# Patient Record
Sex: FEMALE | Race: WHITE | URBAN - METROPOLITAN AREA
[De-identification: names, ages, dates, MRNs, and addresses within clinical notes are randomized per-mention and may not be internally consistent; named-entity substitution may affect disease eponyms.]

---

## 2017-07-31 ENCOUNTER — ANESTHESIA - HEALTHEAST (OUTPATIENT)
Dept: SURGERY | Facility: CLINIC | Age: 62
End: 2017-07-31

## 2017-08-02 ENCOUNTER — ANESTHESIA - HEALTHEAST (OUTPATIENT)
Dept: SURGERY | Facility: CLINIC | Age: 62
End: 2017-08-02

## 2017-08-04 ENCOUNTER — ANESTHESIA - HEALTHEAST (OUTPATIENT)
Dept: SURGERY | Facility: CLINIC | Age: 62
End: 2017-08-04

## 2017-08-07 ENCOUNTER — ANESTHESIA - HEALTHEAST (OUTPATIENT)
Dept: SURGERY | Facility: CLINIC | Age: 62
End: 2017-08-07

## 2017-08-11 ENCOUNTER — ANESTHESIA - HEALTHEAST (OUTPATIENT)
Dept: SURGERY | Facility: CLINIC | Age: 62
End: 2017-08-11

## 2017-08-14 ENCOUNTER — ANESTHESIA - HEALTHEAST (OUTPATIENT)
Dept: SURGERY | Facility: CLINIC | Age: 62
End: 2017-08-14

## 2017-08-16 ENCOUNTER — ANESTHESIA - HEALTHEAST (OUTPATIENT)
Dept: SURGERY | Facility: CLINIC | Age: 62
End: 2017-08-16

## 2017-08-21 ENCOUNTER — ANESTHESIA - HEALTHEAST (OUTPATIENT)
Dept: SURGERY | Facility: CLINIC | Age: 62
End: 2017-08-21

## 2017-08-25 ENCOUNTER — ANESTHESIA - HEALTHEAST (OUTPATIENT)
Dept: SURGERY | Facility: CLINIC | Age: 62
End: 2017-08-25

## 2017-08-28 ENCOUNTER — ANESTHESIA - HEALTHEAST (OUTPATIENT)
Dept: SURGERY | Facility: CLINIC | Age: 62
End: 2017-08-28

## 2017-08-30 ENCOUNTER — ANESTHESIA - HEALTHEAST (OUTPATIENT)
Dept: SURGERY | Facility: CLINIC | Age: 62
End: 2017-08-30

## 2017-09-01 ENCOUNTER — ANESTHESIA - HEALTHEAST (OUTPATIENT)
Dept: SURGERY | Facility: CLINIC | Age: 62
End: 2017-09-01

## 2017-09-07 ENCOUNTER — ANESTHESIA - HEALTHEAST (OUTPATIENT)
Dept: SURGERY | Facility: CLINIC | Age: 62
End: 2017-09-07

## 2017-09-15 ENCOUNTER — ANESTHESIA - HEALTHEAST (OUTPATIENT)
Dept: SURGERY | Facility: CLINIC | Age: 62
End: 2017-09-15

## 2017-09-22 ENCOUNTER — ANESTHESIA - HEALTHEAST (OUTPATIENT)
Dept: SURGERY | Facility: CLINIC | Age: 62
End: 2017-09-22

## 2017-09-29 ENCOUNTER — ANESTHESIA - HEALTHEAST (OUTPATIENT)
Dept: SURGERY | Facility: CLINIC | Age: 62
End: 2017-09-29

## 2017-10-06 ENCOUNTER — ANESTHESIA - HEALTHEAST (OUTPATIENT)
Dept: SURGERY | Facility: CLINIC | Age: 62
End: 2017-10-06

## 2017-10-13 ENCOUNTER — ANESTHESIA - HEALTHEAST (OUTPATIENT)
Dept: SURGERY | Facility: CLINIC | Age: 62
End: 2017-10-13

## 2017-10-20 ENCOUNTER — ANESTHESIA - HEALTHEAST (OUTPATIENT)
Dept: SURGERY | Facility: CLINIC | Age: 62
End: 2017-10-20

## 2017-10-30 ENCOUNTER — ANESTHESIA - HEALTHEAST (OUTPATIENT)
Dept: SURGERY | Facility: CLINIC | Age: 62
End: 2017-10-30

## 2017-11-10 ENCOUNTER — ANESTHESIA - HEALTHEAST (OUTPATIENT)
Dept: SURGERY | Facility: CLINIC | Age: 62
End: 2017-11-10

## 2017-11-17 ENCOUNTER — ANESTHESIA - HEALTHEAST (OUTPATIENT)
Dept: SURGERY | Facility: CLINIC | Age: 62
End: 2017-11-17

## 2017-11-24 ENCOUNTER — ANESTHESIA - HEALTHEAST (OUTPATIENT)
Dept: SURGERY | Facility: CLINIC | Age: 62
End: 2017-11-24

## 2017-12-01 ENCOUNTER — ANESTHESIA - HEALTHEAST (OUTPATIENT)
Dept: SURGERY | Facility: CLINIC | Age: 62
End: 2017-12-01

## 2017-12-08 ENCOUNTER — ANESTHESIA - HEALTHEAST (OUTPATIENT)
Dept: SURGERY | Facility: CLINIC | Age: 62
End: 2017-12-08

## 2017-12-15 ENCOUNTER — ANESTHESIA - HEALTHEAST (OUTPATIENT)
Dept: SURGERY | Facility: CLINIC | Age: 62
End: 2017-12-15

## 2018-02-16 ENCOUNTER — OFFICE VISIT - HEALTHEAST (OUTPATIENT)
Dept: FAMILY MEDICINE | Facility: CLINIC | Age: 63
End: 2018-02-16

## 2018-02-16 DIAGNOSIS — Z12.31 VISIT FOR SCREENING MAMMOGRAM: ICD-10-CM

## 2018-02-16 DIAGNOSIS — R63.4 WEIGHT LOSS: ICD-10-CM

## 2018-02-16 DIAGNOSIS — I10 ESSENTIAL HYPERTENSION, BENIGN: ICD-10-CM

## 2018-02-16 DIAGNOSIS — F25.0 SCHIZOAFFECTIVE DISORDER, BIPOLAR TYPE (H): ICD-10-CM

## 2018-02-16 ASSESSMENT — MIFFLIN-ST. JEOR: SCORE: 1703.48

## 2018-02-23 ENCOUNTER — AMBULATORY - HEALTHEAST (OUTPATIENT)
Dept: NURSING | Facility: CLINIC | Age: 63
End: 2018-02-23

## 2018-02-23 ENCOUNTER — COMMUNICATION - HEALTHEAST (OUTPATIENT)
Dept: FAMILY MEDICINE | Facility: CLINIC | Age: 63
End: 2018-02-23

## 2018-02-23 DIAGNOSIS — E78.00 PURE HYPERCHOLESTEROLEMIA: ICD-10-CM

## 2018-02-23 DIAGNOSIS — F25.0 SCHIZOAFFECTIVE DISORDER, BIPOLAR TYPE (H): ICD-10-CM

## 2018-02-27 ENCOUNTER — AMBULATORY - HEALTHEAST (OUTPATIENT)
Dept: NURSING | Facility: CLINIC | Age: 63
End: 2018-02-27

## 2018-03-15 ENCOUNTER — AMBULATORY - HEALTHEAST (OUTPATIENT)
Dept: LAB | Facility: CLINIC | Age: 63
End: 2018-03-15

## 2018-03-15 ENCOUNTER — AMBULATORY - HEALTHEAST (OUTPATIENT)
Dept: NURSING | Facility: CLINIC | Age: 63
End: 2018-03-15

## 2018-03-15 DIAGNOSIS — F25.0 SCHIZOAFFECTIVE DISORDER, BIPOLAR TYPE (H): ICD-10-CM

## 2018-03-15 DIAGNOSIS — E78.00 PURE HYPERCHOLESTEROLEMIA: ICD-10-CM

## 2018-03-15 DIAGNOSIS — Z01.30 BP CHECK: ICD-10-CM

## 2018-03-15 LAB
ALBUMIN SERPL-MCNC: 3.8 G/DL (ref 3.5–5)
ALP SERPL-CCNC: 72 U/L (ref 45–120)
ALT SERPL W P-5'-P-CCNC: 31 U/L (ref 0–45)
ANION GAP SERPL CALCULATED.3IONS-SCNC: 11 MMOL/L (ref 5–18)
AST SERPL W P-5'-P-CCNC: 23 U/L (ref 0–40)
BASOPHILS # BLD AUTO: 0 THOU/UL (ref 0–0.2)
BASOPHILS NFR BLD AUTO: 0 % (ref 0–2)
BILIRUB SERPL-MCNC: 0.3 MG/DL (ref 0–1)
BUN SERPL-MCNC: 13 MG/DL (ref 8–22)
CALCIUM SERPL-MCNC: 9.4 MG/DL (ref 8.5–10.5)
CHLORIDE BLD-SCNC: 108 MMOL/L (ref 98–107)
CHOLEST SERPL-MCNC: 281 MG/DL
CO2 SERPL-SCNC: 24 MMOL/L (ref 22–31)
CREAT SERPL-MCNC: 1.1 MG/DL (ref 0.6–1.1)
EOSINOPHIL # BLD AUTO: 0.2 THOU/UL (ref 0–0.4)
EOSINOPHIL NFR BLD AUTO: 3 % (ref 0–6)
ERYTHROCYTE [DISTWIDTH] IN BLOOD BY AUTOMATED COUNT: 13.9 % (ref 11–14.5)
FASTING STATUS PATIENT QL REPORTED: YES
GFR SERPL CREATININE-BSD FRML MDRD: 51 ML/MIN/1.73M2
GLUCOSE BLD-MCNC: 105 MG/DL (ref 70–125)
HCT VFR BLD AUTO: 44.3 % (ref 35–47)
HDLC SERPL-MCNC: 35 MG/DL
HGB BLD-MCNC: 13.7 G/DL (ref 12–16)
LDLC SERPL CALC-MCNC: 198 MG/DL
LYMPHOCYTES # BLD AUTO: 1.5 THOU/UL (ref 0.8–4.4)
LYMPHOCYTES NFR BLD AUTO: 25 % (ref 20–40)
MCH RBC QN AUTO: 27 PG (ref 27–34)
MCHC RBC AUTO-ENTMCNC: 30.9 G/DL (ref 32–36)
MCV RBC AUTO: 87 FL (ref 80–100)
MONOCYTES # BLD AUTO: 0.3 THOU/UL (ref 0–0.9)
MONOCYTES NFR BLD AUTO: 6 % (ref 2–10)
NEUTROPHILS # BLD AUTO: 4 THOU/UL (ref 2–7.7)
NEUTROPHILS NFR BLD AUTO: 66 % (ref 50–70)
PLATELET # BLD AUTO: 288 THOU/UL (ref 140–440)
PMV BLD AUTO: 11.4 FL (ref 8.5–12.5)
POTASSIUM BLD-SCNC: 4.4 MMOL/L (ref 3.5–5)
PROT SERPL-MCNC: 6.8 G/DL (ref 6–8)
RBC # BLD AUTO: 5.07 MILL/UL (ref 3.8–5.4)
SODIUM SERPL-SCNC: 143 MMOL/L (ref 136–145)
TRIGL SERPL-MCNC: 238 MG/DL
WBC: 6 THOU/UL (ref 4–11)

## 2018-03-16 ENCOUNTER — OFFICE VISIT - HEALTHEAST (OUTPATIENT)
Dept: FAMILY MEDICINE | Facility: CLINIC | Age: 63
End: 2018-03-16

## 2018-03-16 DIAGNOSIS — Z00.00 ROUTINE GENERAL MEDICAL EXAMINATION AT A HEALTH CARE FACILITY: ICD-10-CM

## 2018-03-16 DIAGNOSIS — Z29.9 PREVENTIVE MEASURE: ICD-10-CM

## 2018-03-16 DIAGNOSIS — E78.5 HYPERLIPIDEMIA: ICD-10-CM

## 2018-03-16 DIAGNOSIS — Z12.4 PAPANICOLAOU SMEAR FOR CERVICAL CANCER SCREENING: ICD-10-CM

## 2018-03-16 ASSESSMENT — MIFFLIN-ST. JEOR: SCORE: 1689.87

## 2018-03-19 LAB
HPV SOURCE: NORMAL
HUMAN PAPILLOMA VIRUS 16 DNA: NEGATIVE
HUMAN PAPILLOMA VIRUS 18 DNA: NEGATIVE
HUMAN PAPILLOMA VIRUS FINAL DIAGNOSIS: NORMAL
HUMAN PAPILLOMA VIRUS OTHER HR: NEGATIVE
SPECIMEN DESCRIPTION: NORMAL

## 2018-03-22 LAB

## 2018-03-23 ENCOUNTER — HOSPITAL ENCOUNTER (OUTPATIENT)
Dept: MAMMOGRAPHY | Facility: CLINIC | Age: 63
Discharge: HOME | End: 2018-03-23
Attending: FAMILY MEDICINE

## 2018-03-23 DIAGNOSIS — Z12.31 VISIT FOR SCREENING MAMMOGRAM: ICD-10-CM

## 2018-03-28 ENCOUNTER — COMMUNICATION - HEALTHEAST (OUTPATIENT)
Dept: FAMILY MEDICINE | Facility: CLINIC | Age: 63
End: 2018-03-28

## 2018-05-18 ENCOUNTER — AMBULATORY - HEALTHEAST (OUTPATIENT)
Dept: LAB | Facility: CLINIC | Age: 63
End: 2018-05-18

## 2018-05-18 ENCOUNTER — AMBULATORY - HEALTHEAST (OUTPATIENT)
Dept: FAMILY MEDICINE | Facility: CLINIC | Age: 63
End: 2018-05-18

## 2018-05-18 DIAGNOSIS — F25.0 SCHIZOAFFECTIVE DISORDER, BIPOLAR TYPE (H): ICD-10-CM

## 2018-05-18 DIAGNOSIS — E78.00 PURE HYPERCHOLESTEROLEMIA: ICD-10-CM

## 2018-05-18 DIAGNOSIS — Z79.899 HIGH RISK MEDICATION USE: ICD-10-CM

## 2018-05-18 LAB
ALBUMIN SERPL-MCNC: 3.7 G/DL (ref 3.5–5)
ALP SERPL-CCNC: 89 U/L (ref 45–120)
ALT SERPL W P-5'-P-CCNC: 18 U/L (ref 0–45)
ANION GAP SERPL CALCULATED.3IONS-SCNC: 11 MMOL/L (ref 5–18)
AST SERPL W P-5'-P-CCNC: 19 U/L (ref 0–40)
BASOPHILS # BLD AUTO: 0 THOU/UL (ref 0–0.2)
BASOPHILS NFR BLD AUTO: 0 % (ref 0–2)
BILIRUB DIRECT SERPL-MCNC: 0.1 MG/DL
BILIRUB SERPL-MCNC: 0.3 MG/DL (ref 0–1)
BUN SERPL-MCNC: 10 MG/DL (ref 8–22)
CALCIUM SERPL-MCNC: 9.5 MG/DL (ref 8.5–10.5)
CHLORIDE BLD-SCNC: 109 MMOL/L (ref 98–107)
CHOLEST SERPL-MCNC: 158 MG/DL
CO2 SERPL-SCNC: 25 MMOL/L (ref 22–31)
CREAT SERPL-MCNC: 0.89 MG/DL (ref 0.6–1.1)
EOSINOPHIL # BLD AUTO: 0.2 THOU/UL (ref 0–0.4)
EOSINOPHIL NFR BLD AUTO: 3 % (ref 0–6)
ERYTHROCYTE [DISTWIDTH] IN BLOOD BY AUTOMATED COUNT: 13.1 % (ref 11–14.5)
FASTING STATUS PATIENT QL REPORTED: YES
GFR SERPL CREATININE-BSD FRML MDRD: >60 ML/MIN/1.73M2
GLUCOSE BLD-MCNC: 103 MG/DL (ref 70–125)
HBA1C MFR BLD: 5.4 % (ref 3.5–6)
HCT VFR BLD AUTO: 42.2 % (ref 35–47)
HDLC SERPL-MCNC: 38 MG/DL
HGB BLD-MCNC: 13.8 G/DL (ref 12–16)
LDLC SERPL CALC-MCNC: 90 MG/DL
LYMPHOCYTES # BLD AUTO: 1.5 THOU/UL (ref 0.8–4.4)
LYMPHOCYTES NFR BLD AUTO: 25 % (ref 20–40)
MCH RBC QN AUTO: 26.4 PG (ref 27–34)
MCHC RBC AUTO-ENTMCNC: 32.8 G/DL (ref 32–36)
MCV RBC AUTO: 80 FL (ref 80–100)
MONOCYTES # BLD AUTO: 0.3 THOU/UL (ref 0–0.9)
MONOCYTES NFR BLD AUTO: 4 % (ref 2–10)
NEUTROPHILS # BLD AUTO: 4.2 THOU/UL (ref 2–7.7)
NEUTROPHILS NFR BLD AUTO: 68 % (ref 50–70)
PLATELET # BLD AUTO: 229 THOU/UL (ref 140–440)
PMV BLD AUTO: 8.3 FL (ref 7–10)
POTASSIUM BLD-SCNC: 4.2 MMOL/L (ref 3.5–5)
PROT SERPL-MCNC: 6.8 G/DL (ref 6–8)
RBC # BLD AUTO: 5.24 MILL/UL (ref 3.8–5.4)
SODIUM SERPL-SCNC: 145 MMOL/L (ref 136–145)
TRIGL SERPL-MCNC: 148 MG/DL
TSH SERPL DL<=0.005 MIU/L-ACNC: 1.18 UIU/ML (ref 0.3–5)
WBC: 6.1 THOU/UL (ref 4–11)

## 2018-05-21 ENCOUNTER — COMMUNICATION - HEALTHEAST (OUTPATIENT)
Dept: FAMILY MEDICINE | Facility: CLINIC | Age: 63
End: 2018-05-21

## 2018-05-24 ENCOUNTER — AMBULATORY - HEALTHEAST (OUTPATIENT)
Dept: NURSING | Facility: CLINIC | Age: 63
End: 2018-05-24

## 2018-06-07 ENCOUNTER — OFFICE VISIT - HEALTHEAST (OUTPATIENT)
Dept: FAMILY MEDICINE | Facility: CLINIC | Age: 63
End: 2018-06-07

## 2018-06-07 DIAGNOSIS — I10 ESSENTIAL HYPERTENSION, BENIGN: ICD-10-CM

## 2018-08-07 ENCOUNTER — AMBULATORY - HEALTHEAST (OUTPATIENT)
Dept: LAB | Facility: CLINIC | Age: 63
End: 2018-08-07

## 2018-08-07 DIAGNOSIS — I10 ESSENTIAL HYPERTENSION, BENIGN: ICD-10-CM

## 2018-08-07 LAB
ANION GAP SERPL CALCULATED.3IONS-SCNC: 13 MMOL/L (ref 5–18)
BUN SERPL-MCNC: 18 MG/DL (ref 8–22)
CALCIUM SERPL-MCNC: 9.7 MG/DL (ref 8.5–10.5)
CHLORIDE BLD-SCNC: 104 MMOL/L (ref 98–107)
CO2 SERPL-SCNC: 24 MMOL/L (ref 22–31)
CREAT SERPL-MCNC: 1.25 MG/DL (ref 0.6–1.1)
GFR SERPL CREATININE-BSD FRML MDRD: 44 ML/MIN/1.73M2
GLUCOSE BLD-MCNC: 82 MG/DL (ref 70–125)
POTASSIUM BLD-SCNC: 4.6 MMOL/L (ref 3.5–5)
SODIUM SERPL-SCNC: 141 MMOL/L (ref 136–145)

## 2018-08-08 ENCOUNTER — COMMUNICATION - HEALTHEAST (OUTPATIENT)
Dept: FAMILY MEDICINE | Facility: CLINIC | Age: 63
End: 2018-08-08

## 2018-08-09 ENCOUNTER — COMMUNICATION - HEALTHEAST (OUTPATIENT)
Dept: FAMILY MEDICINE | Facility: CLINIC | Age: 63
End: 2018-08-09

## 2018-08-13 ENCOUNTER — AMBULATORY - HEALTHEAST (OUTPATIENT)
Dept: FAMILY MEDICINE | Facility: CLINIC | Age: 63
End: 2018-08-13

## 2018-08-13 ENCOUNTER — AMBULATORY - HEALTHEAST (OUTPATIENT)
Dept: LAB | Facility: CLINIC | Age: 63
End: 2018-08-13

## 2018-08-13 DIAGNOSIS — N28.9 RENAL INSUFFICIENCY: ICD-10-CM

## 2018-08-13 LAB
ALBUMIN SERPL-MCNC: 3.8 G/DL (ref 3.5–5)
ALP SERPL-CCNC: 68 U/L (ref 45–120)
ALT SERPL W P-5'-P-CCNC: 25 U/L (ref 0–45)
ANION GAP SERPL CALCULATED.3IONS-SCNC: 11 MMOL/L (ref 5–18)
AST SERPL W P-5'-P-CCNC: 22 U/L (ref 0–40)
BILIRUB SERPL-MCNC: 0.7 MG/DL (ref 0–1)
BUN SERPL-MCNC: 11 MG/DL (ref 8–22)
CALCIUM SERPL-MCNC: 9.3 MG/DL (ref 8.5–10.5)
CHLORIDE BLD-SCNC: 91 MMOL/L (ref 98–107)
CO2 SERPL-SCNC: 23 MMOL/L (ref 22–31)
CREAT SERPL-MCNC: 0.91 MG/DL (ref 0.6–1.1)
GFR SERPL CREATININE-BSD FRML MDRD: >60 ML/MIN/1.73M2
GLUCOSE BLD-MCNC: 97 MG/DL (ref 70–125)
POTASSIUM BLD-SCNC: 4.4 MMOL/L (ref 3.5–5)
PROT SERPL-MCNC: 6.3 G/DL (ref 6–8)
SODIUM SERPL-SCNC: 125 MMOL/L (ref 136–145)

## 2018-08-14 ENCOUNTER — COMMUNICATION - HEALTHEAST (OUTPATIENT)
Dept: SCHEDULING | Facility: CLINIC | Age: 63
End: 2018-08-14

## 2018-08-14 ENCOUNTER — AMBULATORY - HEALTHEAST (OUTPATIENT)
Dept: FAMILY MEDICINE | Facility: CLINIC | Age: 63
End: 2018-08-14

## 2018-08-14 DIAGNOSIS — E87.1 HYPONATREMIA: ICD-10-CM

## 2018-08-15 ENCOUNTER — COMMUNICATION - HEALTHEAST (OUTPATIENT)
Dept: FAMILY MEDICINE | Facility: CLINIC | Age: 63
End: 2018-08-15

## 2018-08-16 ENCOUNTER — COMMUNICATION - HEALTHEAST (OUTPATIENT)
Dept: FAMILY MEDICINE | Facility: CLINIC | Age: 63
End: 2018-08-16

## 2018-09-11 ENCOUNTER — COMMUNICATION - HEALTHEAST (OUTPATIENT)
Dept: FAMILY MEDICINE | Facility: CLINIC | Age: 63
End: 2018-09-11

## 2018-09-11 ENCOUNTER — AMBULATORY - HEALTHEAST (OUTPATIENT)
Dept: LAB | Facility: CLINIC | Age: 63
End: 2018-09-11

## 2018-09-11 ENCOUNTER — AMBULATORY - HEALTHEAST (OUTPATIENT)
Dept: NURSING | Facility: CLINIC | Age: 63
End: 2018-09-11

## 2018-09-11 DIAGNOSIS — E87.1 HYPONATREMIA: ICD-10-CM

## 2018-09-11 LAB
ANION GAP SERPL CALCULATED.3IONS-SCNC: 11 MMOL/L (ref 5–18)
BUN SERPL-MCNC: 14 MG/DL (ref 8–22)
CALCIUM SERPL-MCNC: 9.3 MG/DL (ref 8.5–10.5)
CHLORIDE BLD-SCNC: 102 MMOL/L (ref 98–107)
CO2 SERPL-SCNC: 24 MMOL/L (ref 22–31)
CREAT SERPL-MCNC: 0.84 MG/DL (ref 0.6–1.1)
GFR SERPL CREATININE-BSD FRML MDRD: >60 ML/MIN/1.73M2
GLUCOSE BLD-MCNC: 84 MG/DL (ref 70–125)
POTASSIUM BLD-SCNC: 4.1 MMOL/L (ref 3.5–5)
SODIUM SERPL-SCNC: 137 MMOL/L (ref 136–145)

## 2018-09-14 ENCOUNTER — AMBULATORY - HEALTHEAST (OUTPATIENT)
Dept: FAMILY MEDICINE | Facility: CLINIC | Age: 63
End: 2018-09-14

## 2018-09-18 ENCOUNTER — COMMUNICATION - HEALTHEAST (OUTPATIENT)
Dept: FAMILY MEDICINE | Facility: CLINIC | Age: 63
End: 2018-09-18

## 2018-12-05 ENCOUNTER — COMMUNICATION - HEALTHEAST (OUTPATIENT)
Dept: SCHEDULING | Facility: CLINIC | Age: 63
End: 2018-12-05

## 2018-12-05 ENCOUNTER — COMMUNICATION - HEALTHEAST (OUTPATIENT)
Dept: FAMILY MEDICINE | Facility: CLINIC | Age: 63
End: 2018-12-05

## 2018-12-06 ENCOUNTER — COMMUNICATION - HEALTHEAST (OUTPATIENT)
Dept: FAMILY MEDICINE | Facility: CLINIC | Age: 63
End: 2018-12-06

## 2018-12-20 ENCOUNTER — COMMUNICATION - HEALTHEAST (OUTPATIENT)
Dept: SCHEDULING | Facility: CLINIC | Age: 63
End: 2018-12-20

## 2018-12-21 ENCOUNTER — COMMUNICATION - HEALTHEAST (OUTPATIENT)
Dept: SCHEDULING | Facility: CLINIC | Age: 63
End: 2018-12-21

## 2019-01-07 ENCOUNTER — COMMUNICATION - HEALTHEAST (OUTPATIENT)
Dept: SCHEDULING | Facility: CLINIC | Age: 64
End: 2019-01-07

## 2019-03-04 ENCOUNTER — COMMUNICATION - HEALTHEAST (OUTPATIENT)
Dept: FAMILY MEDICINE | Facility: CLINIC | Age: 64
End: 2019-03-04

## 2019-03-04 DIAGNOSIS — E78.5 HYPERLIPIDEMIA: ICD-10-CM

## 2019-03-04 DIAGNOSIS — I10 ESSENTIAL HYPERTENSION, BENIGN: ICD-10-CM

## 2019-04-26 ENCOUNTER — COMMUNICATION - HEALTHEAST (OUTPATIENT)
Dept: FAMILY MEDICINE | Facility: CLINIC | Age: 64
End: 2019-04-26

## 2019-05-14 ENCOUNTER — OFFICE VISIT - HEALTHEAST (OUTPATIENT)
Dept: FAMILY MEDICINE | Facility: CLINIC | Age: 64
End: 2019-05-14

## 2019-05-14 DIAGNOSIS — I10 ESSENTIAL HYPERTENSION, BENIGN: ICD-10-CM

## 2019-05-14 DIAGNOSIS — Z00.00 ROUTINE GENERAL MEDICAL EXAMINATION AT A HEALTH CARE FACILITY: ICD-10-CM

## 2019-05-14 DIAGNOSIS — L30.4 INTERTRIGO: ICD-10-CM

## 2019-05-14 DIAGNOSIS — F25.0 SCHIZOAFFECTIVE DISORDER, BIPOLAR TYPE (H): ICD-10-CM

## 2019-05-14 DIAGNOSIS — G47.33 OBSTRUCTIVE SLEEP APNEA (ADULT) (PEDIATRIC): ICD-10-CM

## 2019-05-14 DIAGNOSIS — E78.00 PURE HYPERCHOLESTEROLEMIA: ICD-10-CM

## 2019-05-14 DIAGNOSIS — R68.2 DRY MOUTH: ICD-10-CM

## 2019-05-14 LAB
ANION GAP SERPL CALCULATED.3IONS-SCNC: 13 MMOL/L (ref 5–18)
BUN SERPL-MCNC: 16 MG/DL (ref 8–22)
CALCIUM SERPL-MCNC: 10.4 MG/DL (ref 8.5–10.5)
CHLORIDE BLD-SCNC: 106 MMOL/L (ref 98–107)
CHOLEST SERPL-MCNC: 187 MG/DL
CO2 SERPL-SCNC: 21 MMOL/L (ref 22–31)
CREAT SERPL-MCNC: 1 MG/DL (ref 0.6–1.1)
FASTING STATUS PATIENT QL REPORTED: YES
GFR SERPL CREATININE-BSD FRML MDRD: 56 ML/MIN/1.73M2
GLUCOSE BLD-MCNC: 80 MG/DL (ref 70–125)
HDLC SERPL-MCNC: 49 MG/DL
LDLC SERPL CALC-MCNC: 110 MG/DL
POTASSIUM BLD-SCNC: 4 MMOL/L (ref 3.5–5)
SODIUM SERPL-SCNC: 140 MMOL/L (ref 136–145)
TRIGL SERPL-MCNC: 138 MG/DL

## 2019-05-14 ASSESSMENT — MIFFLIN-ST. JEOR: SCORE: 1441.52

## 2019-05-17 ENCOUNTER — COMMUNICATION - HEALTHEAST (OUTPATIENT)
Dept: FAMILY MEDICINE | Facility: CLINIC | Age: 64
End: 2019-05-17

## 2019-06-03 ENCOUNTER — COMMUNICATION - HEALTHEAST (OUTPATIENT)
Dept: FAMILY MEDICINE | Facility: CLINIC | Age: 64
End: 2019-06-03

## 2019-06-03 DIAGNOSIS — I10 ESSENTIAL HYPERTENSION, BENIGN: ICD-10-CM

## 2019-06-03 DIAGNOSIS — E78.5 HYPERLIPIDEMIA: ICD-10-CM

## 2019-10-20 ENCOUNTER — COMMUNICATION - HEALTHEAST (OUTPATIENT)
Dept: SCHEDULING | Facility: CLINIC | Age: 64
End: 2019-10-20

## 2019-10-23 ENCOUNTER — COMMUNICATION - HEALTHEAST (OUTPATIENT)
Dept: CARE COORDINATION | Facility: CLINIC | Age: 64
End: 2019-10-23

## 2019-10-24 ENCOUNTER — COMMUNICATION - HEALTHEAST (OUTPATIENT)
Dept: SCHEDULING | Facility: CLINIC | Age: 64
End: 2019-10-24

## 2019-10-25 ENCOUNTER — OFFICE VISIT - HEALTHEAST (OUTPATIENT)
Dept: FAMILY MEDICINE | Facility: CLINIC | Age: 64
End: 2019-10-25

## 2019-10-25 DIAGNOSIS — J18.9 COMMUNITY ACQUIRED PNEUMONIA OF LEFT LOWER LOBE OF LUNG: ICD-10-CM

## 2019-10-25 DIAGNOSIS — R19.5 LOOSE STOOLS: ICD-10-CM

## 2019-10-25 ASSESSMENT — MIFFLIN-ST. JEOR: SCORE: 1458.54

## 2019-10-25 ASSESSMENT — PATIENT HEALTH QUESTIONNAIRE - PHQ9: SUM OF ALL RESPONSES TO PHQ QUESTIONS 1-9: 5

## 2019-10-28 LAB
GLIADIN IGA SER-ACNC: 5.2 U/ML
GLIADIN IGG SER-ACNC: 0.5 U/ML
IGA SERPL-MCNC: 179 MG/DL (ref 65–400)
TTG IGA SER-ACNC: 0.3 U/ML
TTG IGG SER-ACNC: <0.6 U/ML

## 2019-10-31 ENCOUNTER — COMMUNICATION - HEALTHEAST (OUTPATIENT)
Dept: FAMILY MEDICINE | Facility: CLINIC | Age: 64
End: 2019-10-31

## 2019-11-14 ENCOUNTER — COMMUNICATION - HEALTHEAST (OUTPATIENT)
Dept: FAMILY MEDICINE | Facility: CLINIC | Age: 64
End: 2019-11-14

## 2019-11-22 ENCOUNTER — RECORDS - HEALTHEAST (OUTPATIENT)
Dept: GENERAL RADIOLOGY | Facility: CLINIC | Age: 64
End: 2019-11-22

## 2019-11-22 DIAGNOSIS — J18.1 LOBAR PNEUMONIA, UNSPECIFIED ORGANISM (H): ICD-10-CM

## 2019-12-03 ENCOUNTER — COMMUNICATION - HEALTHEAST (OUTPATIENT)
Dept: FAMILY MEDICINE | Facility: CLINIC | Age: 64
End: 2019-12-03

## 2019-12-10 ENCOUNTER — OFFICE VISIT - HEALTHEAST (OUTPATIENT)
Dept: FAMILY MEDICINE | Facility: CLINIC | Age: 64
End: 2019-12-10

## 2019-12-10 DIAGNOSIS — I10 ESSENTIAL HYPERTENSION, BENIGN: ICD-10-CM

## 2019-12-10 DIAGNOSIS — N95.1 SYMPTOMATIC MENOPAUSAL OR FEMALE CLIMACTERIC STATES: ICD-10-CM

## 2019-12-10 DIAGNOSIS — Z09 HOSPITAL DISCHARGE FOLLOW-UP: ICD-10-CM

## 2019-12-12 ENCOUNTER — COMMUNICATION - HEALTHEAST (OUTPATIENT)
Dept: FAMILY MEDICINE | Facility: CLINIC | Age: 64
End: 2019-12-12

## 2020-01-08 ENCOUNTER — AMBULATORY - HEALTHEAST (OUTPATIENT)
Dept: LAB | Facility: CLINIC | Age: 65
End: 2020-01-08

## 2020-01-08 DIAGNOSIS — I10 ESSENTIAL HYPERTENSION, BENIGN: ICD-10-CM

## 2020-01-08 LAB
ANION GAP SERPL CALCULATED.3IONS-SCNC: 11 MMOL/L (ref 5–18)
BUN SERPL-MCNC: 11 MG/DL (ref 8–22)
CALCIUM SERPL-MCNC: 9.9 MG/DL (ref 8.5–10.5)
CHLORIDE BLD-SCNC: 108 MMOL/L (ref 98–107)
CO2 SERPL-SCNC: 23 MMOL/L (ref 22–31)
CREAT SERPL-MCNC: 1.01 MG/DL (ref 0.6–1.1)
GFR SERPL CREATININE-BSD FRML MDRD: 56 ML/MIN/1.73M2
GLUCOSE BLD-MCNC: 89 MG/DL (ref 70–125)
POTASSIUM BLD-SCNC: 4.7 MMOL/L (ref 3.5–5)
SODIUM SERPL-SCNC: 142 MMOL/L (ref 136–145)

## 2020-01-09 ENCOUNTER — COMMUNICATION - HEALTHEAST (OUTPATIENT)
Dept: FAMILY MEDICINE | Facility: CLINIC | Age: 65
End: 2020-01-09

## 2020-03-08 ENCOUNTER — COMMUNICATION - HEALTHEAST (OUTPATIENT)
Dept: SCHEDULING | Facility: CLINIC | Age: 65
End: 2020-03-08

## 2020-03-08 DIAGNOSIS — N95.1 SYMPTOMATIC MENOPAUSAL OR FEMALE CLIMACTERIC STATES: ICD-10-CM

## 2020-04-23 ENCOUNTER — OFFICE VISIT - HEALTHEAST (OUTPATIENT)
Dept: FAMILY MEDICINE | Facility: CLINIC | Age: 65
End: 2020-04-23

## 2020-04-23 ENCOUNTER — COMMUNICATION - HEALTHEAST (OUTPATIENT)
Dept: SCHEDULING | Facility: CLINIC | Age: 65
End: 2020-04-23

## 2020-04-23 DIAGNOSIS — S93.401A SPRAIN OF RIGHT ANKLE, UNSPECIFIED LIGAMENT, INITIAL ENCOUNTER: ICD-10-CM

## 2020-04-30 ENCOUNTER — COMMUNICATION - HEALTHEAST (OUTPATIENT)
Dept: SCHEDULING | Facility: CLINIC | Age: 65
End: 2020-04-30

## 2020-06-01 ENCOUNTER — COMMUNICATION - HEALTHEAST (OUTPATIENT)
Dept: SCHEDULING | Facility: CLINIC | Age: 65
End: 2020-06-01

## 2020-06-01 ENCOUNTER — COMMUNICATION - HEALTHEAST (OUTPATIENT)
Dept: FAMILY MEDICINE | Facility: CLINIC | Age: 65
End: 2020-06-01

## 2020-06-01 DIAGNOSIS — E78.5 HYPERLIPIDEMIA: ICD-10-CM

## 2020-06-01 DIAGNOSIS — I10 ESSENTIAL HYPERTENSION, BENIGN: ICD-10-CM

## 2020-06-01 DIAGNOSIS — N95.1 SYMPTOMATIC MENOPAUSAL OR FEMALE CLIMACTERIC STATES: ICD-10-CM

## 2020-06-11 ENCOUNTER — COMMUNICATION - HEALTHEAST (OUTPATIENT)
Dept: SCHEDULING | Facility: CLINIC | Age: 65
End: 2020-06-11

## 2020-06-11 DIAGNOSIS — E78.5 HYPERLIPIDEMIA: ICD-10-CM

## 2020-06-12 ENCOUNTER — COMMUNICATION - HEALTHEAST (OUTPATIENT)
Dept: FAMILY MEDICINE | Facility: CLINIC | Age: 65
End: 2020-06-12

## 2020-06-12 DIAGNOSIS — I10 ESSENTIAL HYPERTENSION, BENIGN: ICD-10-CM

## 2020-07-30 ENCOUNTER — OFFICE VISIT - HEALTHEAST (OUTPATIENT)
Dept: FAMILY MEDICINE | Facility: CLINIC | Age: 65
End: 2020-07-30

## 2020-07-30 DIAGNOSIS — H10.32 ACUTE CONJUNCTIVITIS OF LEFT EYE, UNSPECIFIED ACUTE CONJUNCTIVITIS TYPE: ICD-10-CM

## 2020-07-30 DIAGNOSIS — R05.9 COUGH: ICD-10-CM

## 2020-07-30 DIAGNOSIS — R03.0 ELEVATED BLOOD PRESSURE READING: ICD-10-CM

## 2020-07-31 ENCOUNTER — COMMUNICATION - HEALTHEAST (OUTPATIENT)
Dept: SCHEDULING | Facility: CLINIC | Age: 65
End: 2020-07-31

## 2020-08-01 ENCOUNTER — COMMUNICATION - HEALTHEAST (OUTPATIENT)
Dept: SCHEDULING | Facility: CLINIC | Age: 65
End: 2020-08-01

## 2020-08-08 ENCOUNTER — OFFICE VISIT - HEALTHEAST (OUTPATIENT)
Dept: FAMILY MEDICINE | Facility: CLINIC | Age: 65
End: 2020-08-08

## 2020-08-08 DIAGNOSIS — S90.112A CONTUSION OF LEFT GREAT TOE WITHOUT DAMAGE TO NAIL, INITIAL ENCOUNTER: ICD-10-CM

## 2020-08-08 DIAGNOSIS — R03.0 ELEVATED BLOOD PRESSURE READING WITHOUT DIAGNOSIS OF HYPERTENSION: ICD-10-CM

## 2020-08-08 DIAGNOSIS — H10.32 ACUTE CONJUNCTIVITIS OF LEFT EYE, UNSPECIFIED ACUTE CONJUNCTIVITIS TYPE: ICD-10-CM

## 2020-08-08 DIAGNOSIS — R82.90 ABNORMAL URINE: ICD-10-CM

## 2020-08-08 LAB
ALBUMIN UR-MCNC: NEGATIVE MG/DL
APPEARANCE UR: CLEAR
BACTERIA #/AREA URNS HPF: ABNORMAL HPF
BILIRUB UR QL STRIP: NEGATIVE
COLOR UR AUTO: YELLOW
GLUCOSE UR STRIP-MCNC: NEGATIVE MG/DL
HGB UR QL STRIP: NEGATIVE
KETONES UR STRIP-MCNC: NEGATIVE MG/DL
LEUKOCYTE ESTERASE UR QL STRIP: ABNORMAL
NITRATE UR QL: NEGATIVE
PH UR STRIP: 5.5 [PH] (ref 5–8)
RBC #/AREA URNS AUTO: ABNORMAL HPF
SP GR UR STRIP: <=1.005 (ref 1–1.03)
SQUAMOUS #/AREA URNS AUTO: ABNORMAL LPF
UROBILINOGEN UR STRIP-ACNC: ABNORMAL
WBC #/AREA URNS AUTO: ABNORMAL HPF

## 2020-08-09 ENCOUNTER — NURSE TRIAGE (OUTPATIENT)
Dept: NURSING | Facility: CLINIC | Age: 65
End: 2020-08-09

## 2020-08-09 ENCOUNTER — RECORDS - HEALTHEAST (OUTPATIENT)
Dept: SCHEDULING | Facility: CLINIC | Age: 65
End: 2020-08-09

## 2020-08-09 LAB — BACTERIA SPEC CULT: NO GROWTH

## 2020-08-09 NOTE — TELEPHONE ENCOUNTER
"Yodit was in the [facility] walk-in clinic yesterday and wants to know how she would get the results of her urine culture.    Per visit note:  Patient Instructions  Yolanda Hernandez MD - 08/08/2020 1:50 PM CDT    Warm and cool compresses to the eye as needed  Eye lubrication preservative free as needed  See ophthalmologist if not improving.     Urine culture pending - we will call if it shows infection    Recheck toe if worse or no better.     Electronically signed by Yolanda Hernandez MD at 08/08/2020 3:50 PM CDT    ----------------------------------------------------------------------    She has been feeling \"off\" for some time, at least a month.  She has noted unusual change to her urine - bubbles and foaminess    Her blood pressure was elevated at her last 2 walk-in visits - each about a week apart.    Her regular clinic is temporarily closed due to Covid-19. She uses public transportation to go anywhere - Bus lines are limited and so is rider capacity.    Advised to have her blood pressure evaluated as well as blood work to evaluate her kidney function, blood glucose and overall health.    She will work on getting access to a clinic where she can have a PCP evaluation & health monitoring.    Evelia Fung RN  [facility]visors    Additional Information    [1] Follow-up call to recent contact AND [2] information only call, no triage required    Protocols used: INFORMATION ONLY CALL-A-AH      "

## 2020-08-25 ENCOUNTER — COMMUNICATION - HEALTHEAST (OUTPATIENT)
Dept: FAMILY MEDICINE | Facility: CLINIC | Age: 65
End: 2020-08-25

## 2020-08-25 DIAGNOSIS — N95.1 SYMPTOMATIC MENOPAUSAL OR FEMALE CLIMACTERIC STATES: ICD-10-CM

## 2020-08-26 ENCOUNTER — COMMUNICATION - HEALTHEAST (OUTPATIENT)
Dept: SCHEDULING | Facility: CLINIC | Age: 65
End: 2020-08-26

## 2020-08-26 DIAGNOSIS — N95.1 SYMPTOMATIC MENOPAUSAL OR FEMALE CLIMACTERIC STATES: ICD-10-CM

## 2020-08-26 DIAGNOSIS — E78.5 HYPERLIPIDEMIA: ICD-10-CM

## 2020-09-25 ENCOUNTER — COMMUNICATION - HEALTHEAST (OUTPATIENT)
Dept: FAMILY MEDICINE | Facility: CLINIC | Age: 65
End: 2020-09-25

## 2020-09-25 DIAGNOSIS — I10 ESSENTIAL HYPERTENSION, BENIGN: ICD-10-CM

## 2020-10-12 ENCOUNTER — COMMUNICATION - HEALTHEAST (OUTPATIENT)
Dept: FAMILY MEDICINE | Facility: CLINIC | Age: 65
End: 2020-10-12

## 2020-10-16 ENCOUNTER — COMMUNICATION - HEALTHEAST (OUTPATIENT)
Dept: SCHEDULING | Facility: CLINIC | Age: 65
End: 2020-10-16

## 2020-12-03 ENCOUNTER — COMMUNICATION - HEALTHEAST (OUTPATIENT)
Dept: FAMILY MEDICINE | Facility: CLINIC | Age: 65
End: 2020-12-03

## 2020-12-10 ENCOUNTER — OFFICE VISIT - HEALTHEAST (OUTPATIENT)
Dept: FAMILY MEDICINE | Facility: CLINIC | Age: 65
End: 2020-12-10

## 2020-12-10 DIAGNOSIS — E78.00 PURE HYPERCHOLESTEROLEMIA: ICD-10-CM

## 2020-12-10 DIAGNOSIS — E78.5 HYPERLIPIDEMIA LDL GOAL <100: ICD-10-CM

## 2020-12-10 ASSESSMENT — PATIENT HEALTH QUESTIONNAIRE - PHQ9: SUM OF ALL RESPONSES TO PHQ QUESTIONS 1-9: 0

## 2020-12-17 ENCOUNTER — COMMUNICATION - HEALTHEAST (OUTPATIENT)
Dept: FAMILY MEDICINE | Facility: CLINIC | Age: 65
End: 2020-12-17

## 2020-12-22 ENCOUNTER — COMMUNICATION - HEALTHEAST (OUTPATIENT)
Dept: SCHEDULING | Facility: CLINIC | Age: 65
End: 2020-12-22

## 2020-12-22 ENCOUNTER — COMMUNICATION - HEALTHEAST (OUTPATIENT)
Dept: FAMILY MEDICINE | Facility: CLINIC | Age: 65
End: 2020-12-22

## 2020-12-22 ENCOUNTER — OFFICE VISIT - HEALTHEAST (OUTPATIENT)
Dept: FAMILY MEDICINE | Facility: CLINIC | Age: 65
End: 2020-12-22

## 2020-12-22 ENCOUNTER — NURSE TRIAGE (OUTPATIENT)
Dept: NURSING | Facility: CLINIC | Age: 65
End: 2020-12-22

## 2020-12-22 DIAGNOSIS — L30.4 INTERTRIGO: ICD-10-CM

## 2020-12-22 DIAGNOSIS — E78.00 PURE HYPERCHOLESTEROLEMIA: ICD-10-CM

## 2020-12-22 DIAGNOSIS — F48.9 POOR MENTAL HEALTH: ICD-10-CM

## 2020-12-22 DIAGNOSIS — I10 ESSENTIAL HYPERTENSION, BENIGN: ICD-10-CM

## 2020-12-22 DIAGNOSIS — Z00.00 MEDICARE ANNUAL WELLNESS VISIT, SUBSEQUENT: ICD-10-CM

## 2020-12-22 LAB
ANION GAP SERPL CALCULATED.3IONS-SCNC: 11 MMOL/L (ref 5–18)
BUN SERPL-MCNC: 20 MG/DL (ref 8–22)
CALCIUM SERPL-MCNC: 9.3 MG/DL (ref 8.5–10.5)
CHLORIDE BLD-SCNC: 107 MMOL/L (ref 98–107)
CO2 SERPL-SCNC: 23 MMOL/L (ref 22–31)
CREAT SERPL-MCNC: 0.99 MG/DL (ref 0.6–1.1)
GFR SERPL CREATININE-BSD FRML MDRD: 57 ML/MIN/1.73M2
GLUCOSE BLD-MCNC: 105 MG/DL (ref 70–125)
POTASSIUM BLD-SCNC: 4.9 MMOL/L (ref 3.5–5)
SODIUM SERPL-SCNC: 141 MMOL/L (ref 136–145)

## 2020-12-22 ASSESSMENT — PATIENT HEALTH QUESTIONNAIRE - PHQ9: SUM OF ALL RESPONSES TO PHQ QUESTIONS 1-9: 0

## 2020-12-22 ASSESSMENT — MIFFLIN-ST. JEOR: SCORE: 1644.51

## 2020-12-23 ENCOUNTER — DOCUMENTATION ONLY (OUTPATIENT)
Dept: OTHER | Facility: CLINIC | Age: 65
End: 2020-12-23

## 2020-12-23 ENCOUNTER — AMBULATORY - HEALTHEAST (OUTPATIENT)
Dept: OTHER | Facility: CLINIC | Age: 65
End: 2020-12-23

## 2020-12-29 ENCOUNTER — COMMUNICATION - HEALTHEAST (OUTPATIENT)
Dept: INTERNAL MEDICINE | Facility: CLINIC | Age: 65
End: 2020-12-29

## 2021-01-01 ENCOUNTER — COMMUNICATION - HEALTHEAST (OUTPATIENT)
Dept: FAMILY MEDICINE | Facility: CLINIC | Age: 66
End: 2021-01-01

## 2021-01-06 ENCOUNTER — COMMUNICATION - HEALTHEAST (OUTPATIENT)
Dept: FAMILY MEDICINE | Facility: CLINIC | Age: 66
End: 2021-01-06

## 2021-02-04 ENCOUNTER — COMMUNICATION - HEALTHEAST (OUTPATIENT)
Dept: FAMILY MEDICINE | Facility: CLINIC | Age: 66
End: 2021-02-04

## 2021-04-22 ENCOUNTER — HOSPITAL ENCOUNTER (OUTPATIENT)
Dept: MAMMOGRAPHY | Facility: CLINIC | Age: 66
Discharge: HOME | End: 2021-04-22

## 2021-04-22 DIAGNOSIS — Z12.31 VISIT FOR SCREENING MAMMOGRAM: ICD-10-CM

## 2021-05-18 ENCOUNTER — RECORDS - HEALTHEAST (OUTPATIENT)
Dept: SCHEDULING | Facility: CLINIC | Age: 66
End: 2021-05-18

## 2021-05-26 ASSESSMENT — PATIENT HEALTH QUESTIONNAIRE - PHQ9
SUM OF ALL RESPONSES TO PHQ QUESTIONS 1-9: 5
SUM OF ALL RESPONSES TO PHQ QUESTIONS 1-9: 0

## 2021-05-27 VITALS
HEART RATE: 92 BPM | DIASTOLIC BLOOD PRESSURE: 112 MMHG | TEMPERATURE: 99.2 F | SYSTOLIC BLOOD PRESSURE: 196 MMHG | RESPIRATION RATE: 18 BRPM | OXYGEN SATURATION: 96 %

## 2021-05-27 ASSESSMENT — PATIENT HEALTH QUESTIONNAIRE - PHQ9: SUM OF ALL RESPONSES TO PHQ QUESTIONS 1-9: 0

## 2021-05-28 NOTE — PROGRESS NOTES
Assessment and Plan:       1. Routine general medical examination at a health care facility  She is doing well on improving her diet - exercise discussed    2. Schizoaffective disorder, bipolar type (H)/and PTSD  This does affect her ability to feel safe in public places and on public transportation - form for ride service through Medicaid completed    3. Intertrigo  - nystatin (MYCOSTATIN) cream; Use as directed  Dispense: 30 g; Refill: 0    4. Benign Essential Hypertension  Well controlled on: lisinopril 20 mg daily  - Basic Metabolic Panel    5. Essential Hypercholesterolemia  Assess Crestor effectiveness with   - Lipid Richardson FASTING    6. Dry mouth  This may be due to olanzapine.  Yodit notes she has been treated in the past with magic mouthwash including nystatin to restore her mouth stefania, ordered by a researches at [facility]. She would like this again.  I did discuss I'd be hesitant to use nystatin in absence of thrush, but did describe that she could make her own swish and spit with Maalox and liquid benadryl      The patient's current medical problems were reviewed.      The following health maintenance schedule was reviewed with the patient and provided in printed form in the after visit summary:   Health Maintenance   Topic Date Due     ZOSTER VACCINES (1 of 2) 06/18/2007     TD 18+ HE  02/15/2020     INFLUENZA VACCINE RULE BASED (Season Ended) 08/01/2019     DEPRESSION FOLLOW UP  11/14/2019     MAMMOGRAM  03/23/2020     COLONOSCOPY  02/08/2021     PAP SMEAR  03/16/2023     ADVANCE DIRECTIVES DISCUSSED WITH PATIENT  05/14/2024     TDAP ADULT ONE TIME DOSE  Completed          Subjective:   Chief Complaint: Yodit Lai is an 63 y.o. female here for an Annual Wellness visit.   HPI:      Schizoaffective Bipolar disorder - managed by psychiatry - she feels fine    Little interest or pleasure in doing things: Several days  Feeling down, depressed, or hopeless: Several days  Trouble falling or staying asleep,  or sleeping too much: Several days  Feeling tired or having little energy: Several days  Poor appetite or overeating: Not at all  Feeling bad about yourself - or that you are a failure or have let yourself or your family down: Several days  Trouble concentrating on things, such as reading the newspaper or watching television: Not at all  Moving or speaking so slowly that other people could have noticed. Or the opposite - being so fidgety or restless that you have been moving around a lot more than usual: Not at all  Thoughts that you would be better off dead, or of hurting yourself in some way: Not at all  PHQ-9 Total Score: 5  If you checked off any problems, how difficult have these problems made it for you to do your work, take care of things at home, or get along with other people?: Somewhat difficult      Hypertension - Yodit had elevated blood pressure readings - she at one time had been on Lisinopril and HCTZ separately so I had started the combination pill for her.  However labs came back with low sodium so my colleague Chi Galdamez switched her to lisinopril along 20 mg daily and her basic metabolic panel returned to normal.  She has tolerated this medication well.  She says when she sees her psychiatrist, her blood pressure checks have been ok    BP Readings from Last 3 Encounters:   05/14/19 128/80   09/11/18 138/80   06/07/18 118/88     I review other issues and Yodit's chart with her    Dry mouth - she says she has been treated in the past with magic mouthwash including nystatin to restore her mouth stefania, ordered by a researches at [facility]. She would like this again.  I did discuss I'd be hesitant to use nystatin in absence of thrush, but did describe that she could make her own swish and spit with Maalox and liquid benadryl    Chemical Sensitivities - chronic issue: to indoor and outdoor air pollution - diesel, perfume, cigarette smoke, cleaning agents - even at home vinegar makes her sensitive    Sleep  apnea - this has always been on her chart but I see no further description or sleep study.  Nor do I see it listed in other histories in Care Everywhere.  Yodit says that her doctor just put sleep apnea down so that she would get regular blood pressure check while she was having ECT therapy. I have also reviewed Dr. Coffman's notes in the archive, and again - no description, just noted in history  She does not use CPAP    -----    Diet - Healthy choices when she eats out, eats [employer] organic dinner, fasts her way through lunch - lots of spring water  - this has helped her loose weight!  Wt Readings from Last 3 Encounters:   05/14/19 190 lb (86.2 kg)   06/07/18 (!) 225 lb (102.1 kg)   05/24/18 (!) 228 lb (103.4 kg)       Exercise - walks some    ADLS/IADLs   - lives independently  a duplex (a 'house cut in two - original single family home)  No internet access .  She has an ILS worker   - however she gets stressed taking public transportation and then cannot think correctly.  She has some agoraphobia - does not feel comfortable going ou shopping - she asks me to fill out a form for transportation through medicaid - I did this based on the aforementioned difficulties with public transportation and places    Review of Systems:    no chest pains, palpitations or dyspnea  No digestive issues   no muscle or joint pains    Patient Care Team:  Vinita Ortega MD as PCP - General (Family Medicine)     Patient Active Problem List   Diagnosis     Obstructive Sleep Apnea     Chronic Fatigue Syndrome     Posttraumatic stress disorder     Multiple Environmental Allergies     Chronic Pulpitis     Essential Hypercholesterolemia     Fibromyalgia     Benign Essential Hypertension     Depression     Lump Or Mass In Breast     Schizoaffective disorder, bipolar type (H)     Past Medical History:   Diagnosis Date     Depression      Obesity      Schizoaffective disorder (H)       Past Surgical History:   Procedure Laterality Date     CYST  REMOVAL  1992    for epidermoid cyst on back     WI APPENDECTOMY      Description: Appendectomy;  Recorded: 01/15/2009;  Comments: during [employer]     WI CORRJ HALLUX VALGUS W/SESMDC W/2 OSTEOT      Description: Hallux Valgus (Bunion) Correction;  Recorded: 01/15/2009;  Comments: bilateral     WI EXCIS TENDON SHEATH LESION, HAND/FINGER      Description: Hand Excision Of A Tendon Cyst;  Recorded: 03/23/2010;  Comments: L forefinger      Family History   Problem Relation Age of Onset     Thyroid disease Mother      Mental illness Mother      Hearing loss Mother      Hypertension Father      Heart attack Father      No Medical Problems Sister      No Medical Problems Brother      No Medical Problems Sister      No Medical Problems Brother      No Medical Problems Brother      No Medical Problems Brother      No Medical Problems Brother      Other Brother         [place]     Other Brother         not sure      Social History     Socioeconomic History     Marital status: Single     Spouse name: Not on file     Number of children: Not on file     Years of education: Not on file     Highest education level: Not on file   Occupational History     Not on file   Social Needs     Financial resource strain: Not on file     Food insecurity:     Worry: Not on file     Inability: Not on file     Transportation needs:     Medical: Not on file     Non-medical: Not on file   Tobacco Use     Smoking status: Never Smoker     Smokeless tobacco: Never Used   Substance and Sexual Activity     Alcohol use: No     Drug use: No     Sexual activity: Not Currently   Lifestyle     Physical activity:     Days per week: Not on file     Minutes per session: Not on file     Stress: Not on file   Relationships     Social connections:     Talks on phone: Not on file     Gets together: Not on file     Attends Alevism service: Not on file     Active member of club or organization: Not on file     Attends meetings of clubs or  "organizations: Not on file     Relationship status: Not on file     Intimate partner violence:     Fear of current or ex partner: Not on file     Emotionally abused: Not on file     Physically abused: Not on file     Forced sexual activity: Not on file   Other Topics Concern     Not on file   Social History Narrative     Not on file      Current Outpatient Medications   Medication Sig Dispense Refill     lisinopril (PRINIVIL,ZESTRIL) 20 MG tablet Take 1 tablet (20 mg total) by mouth daily. 90 tablet 0     nystatin (MYCOSTATIN) cream Use as directed 30 g 0     OLANZapine (ZYPREXA) 20 MG tablet Take 1 tablet (20 mg total) by mouth at bedtime. 30 tablet 0     rosuvastatin (CRESTOR) 10 MG tablet Take 1 tablet (10 mg total) by mouth at bedtime. 90 tablet 0     No current facility-administered medications for this visit.       Objective:   Vital Signs:   Visit Vitals  /80   Pulse 69   Ht 5' 6.5\" (1.689 m)   Wt 190 lb (86.2 kg)   BMI 30.21 kg/m         VisionScreening:  No exam data present     PHYSICAL EXAM  General appearance - alert, well appearing, and in no distress  Mental status - alert, oriented to person, place, and time; flat affect, constricted mood;  Normal behavior, speech, dress, motor activity, and thought processes  Eyes - pupils equal and reactive, extraocular eye movements intact  Ears - bilateral TM's and external ear canals normal  Mouth - mucous membranes moist, pharynx normal without lesions and mouth dry but no thrush noted  Neck - supple, no significant adenopathy, carotids upstroke normal bilaterally, no bruits, thyroid exam: thyroid is normal in size without nodules or tenderness  Chest - clear to auscultation, no wheezes, rales or rhonchi, symmetric air entry  Heart - normal rate, regular rhythm, normal S1, S2, no murmurs, rubs, clicks or gallops  Abdomen - soft, nontender, nondistended, no masses or organomegaly  Breasts - breasts appear normal, no suspicious masses, no skin or nipple " changes or axillary nodes  Neurological - alert, oriented, normal speech, no focal findings or movement disorder noted, DTR's normal and symmetric  Extremities - peripheral pulses normal, no pedal edema, no clubbing or cyanosis  Skin - no  worrisome lesions; intertrigo changes under breasts        Assessment Results 5/14/2019   Activities of Daily Living No help needed   Instrumental Activities of Daily Living 2-4 - Moderate impairment   Get Up and Go Score -   Mini Cog Total Score 5   Some recent data might be hidden     A Mini-Cog score of 0-2 suggests the possibility of dementia, score of 3-5 suggests no dementia    Identified Health Risks:     The patient was provided with suggestions to help her develop a healthy physical lifestyle.   She is at risk for lack of exercise and has been provided with information to increase physical activity for the benefit of her well-being.  The patient reports that she has difficulty with instrumental activities of daily living.  She was provided with a list of local organizations that provide support services, though she does already have access to support  Information regarding advance directives (living sandra), including where she can download the appropriate form, was provided to the patient via the AVS.

## 2021-05-28 NOTE — TELEPHONE ENCOUNTER
Left message to call back for: regarding request for transportation Medicaid coverage   Information to relay to patient:  Please inform pt, PCP received form inquiring about pt's functional abilities and limitations to receive transportation through Medicaid coverage. PCP is not comfortable filling out form without a Face to face visit. Plus pt has not been seen since 6/2018. Please assist pt in scheduling appt with PCP, schedule annual exam. Thank you.

## 2021-05-28 NOTE — TELEPHONE ENCOUNTER
Patient Returning Call  Reason for call:  Patient returning clinic call    Information relayed to patient:  Patient informed of message from clinic regarding scheduling an appointment for an annual physical.    Patient scheduled for 05/14 at 3:40pm.    Patient has additional questions:  No     If YES, what are your questions/concerns:  N/A    Okay to leave a detailed message?: No call back needed

## 2021-05-29 NOTE — TELEPHONE ENCOUNTER
FYI - Status Update  Who is Calling: Patient  Update: The patient would to have the script requests to be marked as high priority.   Okay to leave a detailed message?:  Yes

## 2021-05-29 NOTE — TELEPHONE ENCOUNTER
Refill Approved    Rx renewed per Medication Renewal Policy. Medication was last renewed on 3/7/19.    Teresa Tee, [facility] Triage/Med Refill 6/3/2019     Requested Prescriptions   Pending Prescriptions Disp Refills     lisinopril (PRINIVIL,ZESTRIL) 20 MG tablet 90 tablet 0     Sig: Take 1 tablet (20 mg total) by mouth daily.       Ace Inhibitors Refill Protocol Passed - 6/3/2019  2:53 PM        Passed - PCP or prescribing provider visit in past 12 months       Last office visit with prescriber/PCP: 6/7/2018 Vinita Ortega MD OR same dept: 6/7/2018 Vinita Ortega MD OR same specialty: 6/7/2018 Vinita Ortega MD  Last physical: 5/14/2019 Last MTM visit: Visit date not found   Next visit within 3 mo: Visit date not found  Next physical within 3 mo: Visit date not found  Prescriber OR PCP: Vinita Ortega MD  Last diagnosis associated with med order: 1. Benign Essential Hypertension  - lisinopril (PRINIVIL,ZESTRIL) 20 MG tablet; Take 1 tablet (20 mg total) by mouth daily.  Dispense: 90 tablet; Refill: 0    2. Hyperlipidemia  - rosuvastatin (CRESTOR) 10 MG tablet; Take 1 tablet (10 mg total) by mouth at bedtime.  Dispense: 90 tablet; Refill: 0    If protocol passes may refill for 12 months if within 3 months of last provider visit (or a total of 15 months).             Passed - Serum Potassium in past 12 months     Lab Results   Component Value Date    Potassium 4.0 05/14/2019             Passed - Blood pressure filed in past 12 months     BP Readings from Last 1 Encounters:   05/14/19 128/80             Passed - Serum Creatinine in past 12 months     Creatinine   Date Value Ref Range Status   05/14/2019 1.00 0.60 - 1.10 mg/dL Final             rosuvastatin (CRESTOR) 10 MG tablet 90 tablet 0     Sig: Take 1 tablet (10 mg total) by mouth at bedtime.       Statins Refill Protocol (Hmg CoA Reductase Inhibitors) Passed - 6/3/2019  2:53 PM        Passed - PCP or prescribing provider visit in past 12 months       Last office visit with prescriber/PCP: 6/7/2018 Vinita Ortega MD [place] same dept: 6/7/2018 Vinita Ortega MD OR same specialty: 6/7/2018 Vinita Ortega MD  Last physical: 5/14/2019 Last MTM visit: Visit date not found   Next visit within 3 mo: Visit date not found  Next physical within 3 mo: Visit date not found  Prescriber OR PCP: Vinita Ortega MD  Last diagnosis associated with med order: 1. Benign Essential Hypertension  - lisinopril (PRINIVIL,ZESTRIL) 20 MG tablet; Take 1 tablet (20 mg total) by mouth daily.  Dispense: 90 tablet; Refill: 0    2. Hyperlipidemia  - rosuvastatin (CRESTOR) 10 MG tablet; Take 1 tablet (10 mg total) by mouth at bedtime.  Dispense: 90 tablet; Refill: 0    If protocol passes may refill for 12 months if within 3 months of last provider visit (or a total of 15 months).

## 2021-06-01 ENCOUNTER — RECORDS - HEALTHEAST (OUTPATIENT)
Dept: ADMINISTRATIVE | Facility: CLINIC | Age: 66
End: 2021-06-01

## 2021-06-01 VITALS — HEIGHT: 67 IN | WEIGHT: 246 LBS | BODY MASS INDEX: 38.61 KG/M2

## 2021-06-01 VITALS — WEIGHT: 225 LBS | BODY MASS INDEX: 35.24 KG/M2

## 2021-06-01 VITALS — HEIGHT: 67 IN | WEIGHT: 243 LBS | BODY MASS INDEX: 38.14 KG/M2

## 2021-06-01 VITALS — BODY MASS INDEX: 35.71 KG/M2 | WEIGHT: 228 LBS

## 2021-06-02 NOTE — TELEPHONE ENCOUNTER
Patient contacted and message from Dr. Hackett relayed to patient:  Agree with goal to stay well hydrated and be seen tomorrow by PCP.  If symptoms worsen - diarrhea is worse or bloody, should return to ER.     She says she went and bought some probiotic yogurt and has been eating soups with clear broths.  Appointment is scheduled for tomorrow.    Nedra Gandara RN  Triage Nurse Advisor

## 2021-06-02 NOTE — TELEPHONE ENCOUNTER
RN triage   Call from pt   Pt states she was in ED / hospitalized -- presenting w/ chest and arm pain -- and dx = pneumonia   Pt has hospital f/u clinic visit scheduled for tomorrow   Pt taking levoquin-- last dose will be tomorrow  Pt has diarrhea - 2x/day --   Not dizzy -- no blood in stool -- no abd pain - no fever  Drinking fluids OK -- U.O. OK   Pt chest pain and breathing is better -- no arm pain now -- still some discomfort w/ breathing and sl cough   Reviewed home care advice for diarrhea and resp illness --  Per protocol = should check w/ PCP   Please advise   Nivia Jean RN [facility] RN triage        Reason for Disposition    Recent antibiotic therapy (i.e., within last 2 months)    Protocols used: DIARRHEA-A-OH

## 2021-06-02 NOTE — PROGRESS NOTES
"Assessment and Plan    1. Community acquired pneumonia of left lower lobe of lung (H)  Admitted for chest pain, troponins and stress test negative, CXR showed \" Left basilar fibroatelectasis versus focus of pneumonia. She later developed a cough and feels  she is breathing better after taking antibiotics.  She will need a future xray to document resolution of the pneumonia  - XR Chest 2 Views; Future    2. Loose stools  AT baseline, NOW watery diarrhea  - Celiac(Gluten)Antibody Panel   - C. Difficile if no improvement    Return in about 4 weeks (around 11/22/2019).    Vinita Ortega MD     -------------------------------------------    Chief Complaint   Patient presents with     Hospital Visit Follow Up     [facility], 10/20-10/21, Acute chest pain     Hospital discharge summary:  Yodit Lai is a 64 y.o. female with a medical history significant for morbid obesity, dyslipidemia, hypertension, schizoaffective disorder, fibromyalgia, chronic fatigue syndrome, PTSD, personality disorder, anxiety and depression and strong family history of heart disease admitted to the medical service on 10/21 for evaluation and management of acute chest pain and Left upper extremity pain. Serial troponin and ECG stayed WNL and nuclear stress test the next morning was negative for inducible ischemia. CXR revealed a possible LLL infiltrate and she was started on levaquin. Patient was discahrged to home in stable condition.    Medication changes; Post Discharge Medication Reconciliation Status: discharge medications reconciled and changed, per note/orders (see AVS) - no reason to continue aspirin . See ASCVD 10 risk calculation 3.6% at the end of this section    START taking these medications    aspirin 81 MG EC tablet  Dose:  81 mg  Start taking on:  October 22, 2019  81 mg, Oral, DAILY      levoFLOXacin 500 MG tablet  Quantity:  4 tablet  Dose:  500 mg  Start taking on:  October 22, 2019  Commonly known as:  LEVAQUIN  500 mg, Oral, " "QDaily     Here is the chest xray result    EXAM: XR CHEST 1 VIEW PORTABLE  LOCATION: [facility]  DATE/TIME: 10/20/2019 9:26 PM     INDICATION: Chest pain  COMPARISON: None.     IMPRESSION:   Hyperexpansion possibly from COPD. Left basilar fibroatelectasis versus focus of pneumonia. Heart and central vessels unremarkable. Degenerative disease. Follow-up recommended.    Yodit's narrative  ON the day of admission: \"I woke up with horrible pain  and arm - then by suppertime was ferocious - then I started to have chest pain.\" She says the pain went away after she was given  4 baby aspirin and nitroglycerin.  She had  no sleep at the hospital that night - BP, blood draws  Sent her home with written instructions, aspirin, levaquin. Nurse diane recommended pro-biotic yogurt when she called in with onset of diarrhea the day after discharge    Yodit notes that she had been feeling run down for a month. She continues to have a cough going on - staying about the same - got some waltussin cough syrup.  She hasn't been  sleeping well because she was told to take antibiotic at 6 am and she thought that was exact and that she had to set an alarm/wake up to take it.  She has never felt short of breath - but did feel she was breathing  easier on antibiotic    Yodit has been having loose stools on an ongoing basis, but not diarrhea.   When she was younger - stools were more formed. She does not  remember having a test for celiac disease.  Now she is having watery stool three times a day since starting the Levaquin. She has been having a lot of Croatian soup, which she seems to tolerate better than the usual organic frozen entrees she has for dinner.     She has intentionally lost weight with improvements in her diet and exercise -  including the organic entrees, drinking spring water instead of liquids with more calories, and becoming more active.  She normally does not drink a lot of dairy or have many wheat " products    The 10-year ASCVD risk score (Joycemihir HERRERA Jr., et al., 2013) is: 3.6%    Values used to calculate the score:      Age: 64 years      Sex: Female      Is Non- : No      Diabetic: No      Tobacco smoker: No      Systolic Blood Pressure: 110 mmHg      Is BP treated: Yes      HDL Cholesterol: 43 mg/dL      Total Cholesterol: 139 mg/dL            Current Outpatient Medications on File Prior to Visit   Medication Sig Dispense Refill     lisinopril (PRINIVIL,ZESTRIL) 20 MG tablet Take 1 tablet (20 mg total) by mouth daily. 90 tablet 3     OLANZapine (ZYPREXA) 15 MG tablet Take 15 mg by mouth every evening.       rosuvastatin (CRESTOR) 10 MG tablet Take 1 tablet (10 mg total) by mouth at bedtime. 90 tablet 3     nystatin (MYCOSTATIN) cream Use as directed 30 g 0     [DISCONTINUED] aspirin 81 MG EC tablet Take 1 tablet (81 mg total) by mouth daily.  0     No current facility-administered medications on file prior to visit.        The history section was last reviewed by Carmen Nelson CMA on Oct 25, 2019.    Social History     Tobacco Use   Smoking Status Never Smoker   Smokeless Tobacco Never Used       Social History     Substance and Sexual Activity   Alcohol Use No         Vitals:    10/25/19 1110   BP: 110/84   Pulse: 70     Body mass index is 30.07 kg/m .     EXAM:    General appearance - alert, well appearing, and in no distress  Mental status - normal mood, behavior, speech, dress, motor activity, and thought processes  Chest - clear to auscultation, no wheezes, rales or rhonchi, occasional inspiratory squeak  Heart - normal rate, regular rhythm, normal S1, S2, no murmurs, rubs, clicks or gallops  Abdomen - soft, nontender, nondistended, no masses or organomegaly

## 2021-06-02 NOTE — TELEPHONE ENCOUNTER
RN Assessment/Reason for Call:   Okay to leave Detailed Message  Patient calling in, pain lt arm when trying to sleep  Massaged her arm  Tonight pain is back.Below arm to wrist not her hand.   Long nerve pain.  Pulse is steady and regular.     RN Action/Disposition:  Protocol recommends ER.  Call back if worse symptoms  Discussed home care measures.  Agrees to plan.     Ailyn So RN    Care Connection Triage/med refill  10/20/2019  6:49 PM        Reason for Disposition    [1] Age > 40 AND [2] no obvious cause AND [3] pain even when not moving the arm    (Exception: pain is clearly made worse by moving arm or bending neck)    Protocols used: ARM PAIN-A-AH

## 2021-06-02 NOTE — PROGRESS NOTES
"TCM DISCHARGE FOLLOW UP CALL    Discharge Date:  10/21/2019  Reason for hospital stay (discharge diagnosis)::  Chest Pain, PNA  Are you feeling better, the same or worse since your discharge?:  Patient is feeling better (Breathing improved.  Denies CP, arm pain.)  Do you feel like you have a plan in the event of a health emergency?: Yes (Call friend, Eryn or Patricia.  Pt aware of nurse triage.)    As part of your discharge plan, were  home care services ordered for you?: No    Did you receive any new medications, or was there a change to your medications?: Yes    Are you taking those medications, or do you have any established regiment?:  Pt states she is taking levofloxacin at 6:00am every day.  She's not started ASA yet, stating she's concerned about taking EC ASA because \"years ago\" when taking EC medication (did not mention name of medication), she noted pill had passed in her stool without being digested, so she's concerned the ASA EC tab wouldn't be absorbed, and asked if ok to take ASA 81 mg chewable tablet.  Pt will discuss w/Dr. Galdamez at f/u appt tomorrow.  Do you have any follow up visits scheduled with your PCP or Specialist?:  Yes, with PCP  (RN) Is PCP appt scheduled soon enough (within 14 days of discharge date)?: Yes (Dr. Galdamez 10/24/19)        Ailyn Cohen RN Care Manager, Population Health    "

## 2021-06-02 NOTE — TELEPHONE ENCOUNTER
Agree with goal to stay well hydrated and be seen tomorrow by PCP.  If symptoms worsen - diarrhea is worse or bloody, should return to ER.

## 2021-06-03 VITALS
DIASTOLIC BLOOD PRESSURE: 84 MMHG | BODY MASS INDEX: 30.13 KG/M2 | SYSTOLIC BLOOD PRESSURE: 110 MMHG | HEART RATE: 70 BPM | HEIGHT: 67 IN | WEIGHT: 192 LBS

## 2021-06-03 VITALS — BODY MASS INDEX: 29.82 KG/M2 | WEIGHT: 190 LBS | HEIGHT: 67 IN

## 2021-06-03 NOTE — TELEPHONE ENCOUNTER
New Appointment Needed  What is the reason for the visit:    Same Date/Next Day Appt Request  What is the reason for your visit?: needs Xray- unable to schedule this appointment on [facility]    Provider Preference: PCP only  How soon do you need to be seen?: wants the 22nd at 9:30 or 9:45 am  Waitlist offered?: No  Okay to leave a detailed message:  Yes- ok to schedule and call her with the time

## 2021-06-03 NOTE — TELEPHONE ENCOUNTER
New Appointment Needed  What is the reason for the visit:    Inpatient/ED Follow Up Appt Request  At what hospital or facility were you seen?: [facility]   What is the reason you were seen?: Near syncope   What date were you admitted?: date: 12/02  What date were you discharged?: date: 12/03  What was the recommended timeframe for your follow up appointment?: 3 to 5 days   Provider Preference: PCP only  How soon do you need to be seen?: This week   Waitlist offered?: No  Okay to leave a detailed message:  Yes

## 2021-06-03 NOTE — TELEPHONE ENCOUNTER
Pt was informed that she is scheduled for her follow up chest xray for 9:30 am on Friday 11/22/19.

## 2021-06-03 NOTE — TELEPHONE ENCOUNTER
Left message to call back for: patient  Information to relay to patient:  LM that Dr. Ortega is out of the office this week and will return on Friday 12/6/19.  When pt calls back please help her scheduled and appt with Dr. Ortega on Tuesday 12/10/19 at 1:20 pm or with another providers this week.

## 2021-06-04 VITALS
RESPIRATION RATE: 20 BRPM | DIASTOLIC BLOOD PRESSURE: 81 MMHG | WEIGHT: 210.19 LBS | OXYGEN SATURATION: 92 % | BODY MASS INDEX: 32.92 KG/M2 | HEART RATE: 88 BPM | TEMPERATURE: 98.8 F | SYSTOLIC BLOOD PRESSURE: 173 MMHG

## 2021-06-04 VITALS
WEIGHT: 185.75 LBS | SYSTOLIC BLOOD PRESSURE: 112 MMHG | HEART RATE: 76 BPM | BODY MASS INDEX: 29.09 KG/M2 | DIASTOLIC BLOOD PRESSURE: 74 MMHG

## 2021-06-04 NOTE — TELEPHONE ENCOUNTER
----- Message from Vinita Ortega MD sent at 12/12/2019 10:12 AM CST -----  Please let Yodit now that all xrays have been reviewed now and the previously seen pneumonia is resolved - thank you
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
## 2021-06-04 NOTE — TELEPHONE ENCOUNTER
----- Message from Vinita Ortega MD sent at 11/27/2019  3:37 PM CST -----  This was supposed to be a follow up to CXR done on 10/20/19 - possible pneumonia.  Please call [facility] and ask them to have radiologist confirm comparison to that reading

## 2021-06-04 NOTE — TELEPHONE ENCOUNTER
[facility] contacted and asked to compare the chest xrays from 11/22/19 to the one done 10/20/19.  Radiologist will addend pt's record.

## 2021-06-05 VITALS
WEIGHT: 233 LBS | HEART RATE: 97 BPM | DIASTOLIC BLOOD PRESSURE: 80 MMHG | OXYGEN SATURATION: 98 % | SYSTOLIC BLOOD PRESSURE: 142 MMHG | HEIGHT: 67 IN | BODY MASS INDEX: 36.57 KG/M2

## 2021-06-05 NOTE — TELEPHONE ENCOUNTER
----- Message from Vinita Ortega MD sent at 1/9/2020 10:14 AM CST -----  Please let her know that her basic metabolic panel is essentially normal and that she should continue the lisinopril - thank you

## 2021-06-06 NOTE — TELEPHONE ENCOUNTER
Medication:   Disp  Refills  Start  End  KARLA    PARoxetine (PAXIL) 10 MG tablet  30 tablet  2  12/10/2019   --    Sig - Route: Take 1 tablet (10 mg total) by mouth daily. - Oral        Pharmacy:  [id number] - [place], [place] - [address] [place] [address] [address] [facility]   Last Office Visit:12/10/19

## 2021-06-06 NOTE — TELEPHONE ENCOUNTER
Medication Request  Medication name: Paroxetine 10 mg   Requested Pharmacy: [facility] [address]   Reason for request:  Daily   When did you use medication last?:  Today 3/8/20  Patient offered appointment:  N/A - electronic request  Okay to leave a detailed message: yes

## 2021-06-06 NOTE — TELEPHONE ENCOUNTER
90 Days given.  Please contact her to remind her to schedule annual exam in May. Future such requests should go to refill requests rather than patient calls,

## 2021-06-07 NOTE — TELEPHONE ENCOUNTER
"Pt reports she twisted her ankle last week, scraped shin as well. Egg shaped swelling on muscle next to mid shin and bruised, has been six days. Pt rates pain \"5\". Pt reports she is able to walk normally.     Advised pt to apply heat for ten minutes, three times daily. Reviewed call back protocol with pt.    Pt verbalizes understanding and agrees to plan.     Reason for Disposition    Minor injury or pain from direct blow    Protocols used: LEG INJURY-A-AH      "

## 2021-06-07 NOTE — PROGRESS NOTES
"Yodit Lai is a 64 y.o. female who is being evaluated via a billable telephone visit.      The patient has been notified of following:     \"This telephone visit will be conducted via a call between you and your physician/provider. We have found that certain health care needs can be provided without the need for a physical exam.  This service lets us provide the care you need with a short phone conversation.  If a prescription is necessary we can send it directly to your pharmacy.  If lab work is needed we can place an order for that and you can then stop by our lab to have the test done at a later time.    Telephone visits are billed at different rates depending on your insurance coverage. During this emergency period, for some insurers they may be billed the same as an in-person visit.  Please reach out to your insurance provider with any questions.    If during the course of the call the physician/provider feels a telephone visit is not appropriate, you will not be charged for this service.\"    Patient has given verbal consent to a Telephone visit? Yes    Patient would like to receive their AVS by AVS Preference: Mail a copy.    Additional provider notes:     64-year-old female following up today for right ankle injury that occurred earlier this morning.  Patient was walking around the steps of her home and rolled her right ankle while going outside.  She noted immediate pain on the medial aspect of her right ankle which radiated down to her foot and upwards into her right leg.  She says she could walk with tenderness back to her home.  She also noticed an abrasion near the right heel as she fell on the pavement.  Since that time she is been laying down on her sofa with her legs elevated she is applied ice pack after talking to the nurse.  She is injured her right ankle before and that was more severe because she could not put weight on it.  She denies any operative therapies on her right ankle.  She notes no " numbness or tingling in her right foot or right ankle.  She can move her toes without any discomfort.  She is taken to Tylenol which seems to have relieved the pain to some degree.    Assessment/Plan:      1. Sprain of right ankle, unspecified ligament, initial encounter     Patient will continue elevating right lower extremity she will use Tylenol for pain control use ice as needed and transition to heat over the weekend if she has difficulty with ambulation or the pain becomes more severe she will call us back        Phone call duration:  22 minutes    Willie Cerda MD

## 2021-06-07 NOTE — TELEPHONE ENCOUNTER
"Pt was outdoors 30 mins ago.  Stepped down onto cement step.  \"Twisted R ankle.\"  \"R Leg then went down on cement edge of steps.\"  \"Shin got scraped.\"  No bleeding.  However moderate swelling.  Pt describes swelling as \"immediate onset.\"  \"Couldn't believe how fast it swelled up.\"    Still able to wear weight and walk.  However, due to moderate swelling and quick onset of the swelling, decision is telephone provider visit to determine next best steps.  Warm transferred to a  for this purpose now.    Merle Calloway RN  Care Connection Triage    _____________________________    No suspicion of COVID19.  However provided precautionary measures per current protocols:  COVID 19 Nurse Triage Plan/Patient Instructions    Please be aware that novel coronavirus (COVID-19) may be circulating in the community. If you develop symptoms such as fever, cough, or SOB or if you have concerns about the presence of another infection including coronavirus (COVID-19), please contact your health care provider or visit [email or web address].     Disposition/Instructions    Additional COVID19 information to add for patients.     Additional General Information About COVID-19    COVID-19 - General Information:  Regardless of if you have been tested or not:  Patient who have symptoms (cough, fever, or shortness of breath), need to isolate for 7 days from when symptoms started AND 72 hours after fever resolves (without fever reducing medications) AND improvement of respiratory symptoms (whichever is longer).      Isolate yourself at home (in own room/own bathroom if possible)    Do Not allow any visitors    Do Not go to work or school    Do Not go to [employer],  centers, shopping, or other public places.    Do Not shake hands.    Avoid close and intimate contact with others (hugging, kissing).    Follow CDC recommendations for household cleaning of frequently touched services.     After the initial 7 days, continue to isolate yourself " from household members as much as possible. To continue decrease the risk of community spread and exposure, you and any members of your household should limit activities in public for 14 days after starting home isolation.     You can reference the following CDC link for helpful home isolation/care tips:  https://www.cdc.gov/coronavirus/2019-ncov/downloads/10Things.pdf    COVID-19 - Symptoms:     The COVID-19 can cause a respiratory illness, such as bronchitis or pneumonia.    The most common symptoms are: cough, fever, and shortness of breath.     Other symptoms are: body aches, chills, diarrhea, fatigue, headache, runny nose, and sore throat     COVID-19 - Exposure Risk Factors:    Exposure to a person who has been diagnosed with COVID-19 .    Travel from an area with recent local transmission of COVID-19 .    The CDC (www.cdc.gov) has the most up-to-date list of where the COVID-19 outbreak is occurring.    COVID-19 - Spreading:     The virus likely spreads through respiratory droplets produced when a person coughs or sneezes. These respiratory droplets can travel approximately 6 feet and can remain on surfaces.  Common disinfectants will kill the virus.    The CDC currently does not recommend healthy people wearing masks.    COVID-19 - Protect Yourself:     Avoid close contact with people known to have this new COVID-19 infection.    Wash hands often with soap and water or alcohol-based hand .    Avoid touching the eyes, nose or mouth.       Thank you for limiting contact with others, wearing a simple mask to cover your cough, practice good hand hygiene habits and accessing our virtual services where possible to limit the spread of this virus.    For more information about COVID19 and options for caring for yourself at home, please visit the CDC website at https://www.cdc.gov/coronavirus/2019-ncov/about/steps-when-sick.html  For more options for care at [facility], please visit our website at  [email or web address]    For more information, please use the [place] Department of Health COVID-19 Website: https://www.health.[facility].[place].us/diseases/coronavirus/index.html  [place] Department of Health ([facility]) COVID-19 Hotlines (Interpreters available):      Health questions: Phone Number: [phone] or 1-398.693.8252 and Hours: 7 a.m. to 7 p.m.    Schools and  questions: Phone Number: [phone] or 1-378.376.9297 and Hours 7 a.m. to 7 p.m.                         Reason for Disposition    Large swelling or bruise and size > palm of person's hand    High-risk adult (e.g., age > 60, osteoporosis, chronic steroid use) and limping    Protocols used: LEG INJURY-A-[place]

## 2021-06-08 NOTE — TELEPHONE ENCOUNTER
Pt is calling in about her prescription of Crestor. Pharmacy shows it was received, approved, and filled, and pt insists pharmacy is telling her it is not there, and was not approved.     Please call Yodit SMITH at [phone].     Provider please advise.    Alexander Flores RN Care Connection Triage/Medication Refill

## 2021-06-08 NOTE — TELEPHONE ENCOUNTER
"FYI - Status Update  Who is Calling: Patient  Update:  This prescription was sent as \"phone in\" on June 2 but it was not called in.  Please send medication today.    Okay to leave a detailed message?:  No return call needed    "

## 2021-06-08 NOTE — TELEPHONE ENCOUNTER
WILL GIVE THREE months - please have her schedule annual wellness in September    Details on RXs (for the record)     Re: LISINOPRIL AND PAROXETINE    Hospital follow up visit wIth me 12/10/19 (lisinopril had been discontinued during hospital stay, but she stil had medication)       1. Hospital discharge follow-up  Much improved, cardiac work up negative     2. Symptomatic menopausal or female climacteric states  Trial of    - Paroxetine (PAXIL) 10 MG tablet; Take 1 tablet (10 mg total) by mouth daily.  Dispense: 30 tablet; Refill: 2     3. Benign Essential Hypertension  - Basic Metabolic Panel; Future     She will change back to lisinopril and return for follow up bmp 1 week after starting lisinopril     Vinita Ortega MD     ---    Results for orders placed or performed in visit on 01/08/20   Basic Metabolic Panel   Result Value Ref Range    Sodium 142 136 - 145 mmol/L    Potassium 4.7 3.5 - 5.0 mmol/L    Chloride 108 (H) 98 - 107 mmol/L    CO2 23 22 - 31 mmol/L    Anion Gap, Calculation 11 5 - 18 mmol/L    Glucose 89 70 - 125 mg/dL    Calcium 9.9 8.5 - 10.5 mg/dL    BUN 11 8 - 22 mg/dL    Creatinine 1.01 0.60 - 1.10 mg/dL    GFR MDRD Af Amer >60 >60 mL/min/1.73m2    GFR MDRD Non Af Amer 56 (L) >60 mL/min/1.73m2     RE rosuvastatin:   Lab Results   Component Value Date    LDLCALC 81 10/21/2019     RE paxil - the is for hot flashes

## 2021-06-08 NOTE — TELEPHONE ENCOUNTER
Medication Request  Medication name:    Dose  Frequency  Start  End  KARLA    lisinopril tablet 20 mg (PRINIVIL,ZESTRIL) (Discontinued)  20 mg  DAILY  10/21/2019  10/21/2019  --    Admin Instructions: Antihypertensive medication. Hold med 4 hours prior to dialysis unless otherwise instructed    Notes to Pharmacy: PTA Sig:Take 1 tablet (20 mg total) by mouth daily.      Route: Oral        Requested Pharmacy: [facility]  Reason for request: needing refills   When did you use medication last?:    Patient offered appointment:  patient declined  Okay to leave a detailed message: yes
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
## 2021-06-10 NOTE — TELEPHONE ENCOUNTER
"Patient 2 days ago felt an irritation under Left eye lid. Yesterday was seen in Urgent Care for Left eyeredness and irritation.  States a dye was used in Left eye during eye exam and reports felt burning after the exam. States she thinks she reacted to the dye.  History of chemical sensitivity by report.    Today woke up and eye was \"much worse.\" Describes Left lid upper and lower swollen from lid to cheek area. Increased redness noted.  Reports blurred vision and unable to read as usual.  States Pain is 7/10.   Using an antibiotic ointment prescribed at Urgent Care.  Temperature 99.0 at  yesterday. Not checked since.    Protocol- Eye Pain  Care advice given.  Disposition- Per [id number]  Advised ED  Advised to call back if has further questions or concerns.    Peyton Brush RN BSN PHN  Triage Nurse Advisor    COVID 19 Nurse Triage Plan/Patient Instructions    Please be aware that novel coronavirus (COVID-19) may be circulating in the community. If you develop symptoms such as fever, cough, or SOB or if you have concerns about the presence of another infection including coronavirus (COVID-19), please contact your health care provider or visit [email or web address].     Disposition/Instructions    ED Visit recommended. Follow protocol based instructions.      Bring Your Own Device:  Please also bring your smart device(s) (smart phones, tablets, laptops) and their charging cables for your personal use and to communicate with your care team during your visit.      Thank you for taking steps to prevent the spread of this virus.  o Limit your contact with others.  o Wear a simple mask to cover your cough.  o Wash your hands well and often.    Resources    [facility]: About COVID-19: [email or web address]    CDC: What to Do If You're Sick: www.cdc.gov/coronavirus/2019-ncov/about/steps-when-sick.html    CDC: Ending Home Isolation: www.cdc.gov/coronavirus/2019-ncov/hcp/disposition-in-home-patients.html " "    CDC: Caring for Someone: www.cdc.gov/coronavirus/2019-ncov/if-you-are-sick/care-for-someone.html     [facility]: Interim Guidance for Hospital Discharge to Home: www.health.[facility].mn.us/diseases/coronavirus/hcp/hospdischarge.pdf    [facility] clinical trials (COVID-19 research studies): clinicalaffairs.[facility]-clinical-trials     Below are the COVID-19 hotlines at the [place] Department of Health ([facility]). Interpreters are available.   o For health questions: Call 793-713-5888 or 1-200.909.1929 (7 a.m. to 7 p.m.)  o For questions about schools and childcare: Call 464-766-9376 or 1-589.736.8489 (7 a.m. to 7 p.m.)              Reason for Disposition    [1] Blurred vision AND [2] new or worsening    Additional Information    Negative: Severe eye pain    Negative: Complete loss of vision in one or both eyes    Negative: [1] Eyelids are very swollen (shut or almost) AND [2] fever    Negative: [1] Eyelid (outer) is very red AND [2] fever    Negative: [1] Foreign body sensation (\"feels like something is in there\") AND [2] irrigation didn't help    Negative: Vomiting    Negative: Ulcer or sore seen on the cornea (clear center part of the eye)    Negative: [1] Recent eye surgery AND [2] increasing eye pain    Protocols used: EYE PAIN-A-AH      "

## 2021-06-10 NOTE — PROGRESS NOTES
Assessment/Plan:   Acute conjunctivitis of left eye, unspecified acute conjunctivitis type  Cough  Elevated blood pressure reading  Acute conjunctivitis left eye. Mild intermittent FB sensation under upper lid - no FB identified on exam and no corneal abrasion. Mucus and redness present, no light sensitivity. Right eye normal. Occasional cough, minimal congestion. Low grade elevation of temp and several exposures high risk for covid though no known covid. She has no cell phone and no computer and no car therefore we did a covid swab in the office rather than arrange curbside. Encouraged follow up for BP recheck.  Erythromycin ointment for left eye.   I discussed red flag symptoms, return precautions to clinic/ER and follow up care with patient/parent.  Expected clinical course, symptomatic cares advised. Questions answered. Patient/parent amenable with plan.  - Symptomatic COVID-19 Virus (CORONAVIRUS) PCR  - COVID-19 Virus PCR MRF  - erythromycin ophthalmic ointment; Apply ribbon of ointment to the lower lid 4 times a day for 7-10 days  Dispense: 1 g; Refill: 0    Cool compresses for the eye as needed for comfort  Erythromycin ointment 3-4 times a day left eye, may use in the right eye if needed for spread  Wash hands frequently.     You have--or may have--COVID-19. A test was performed in the clinic today. You will be called if the result is positive. If negative, you will be mailed a letter.   Please follow the instructions in handout to isolate and care for yourself.    Recheck if worse or no better.     Patient wore mask throughout the visit.   Support staff wore mask, face shield and gloves during visit then discovered she had a low grade fever and cough so the provider wore full PPE - gown double gloves, full face shield and mask. Provider obtained the covid NP swab.    Subjective:      Yodit Lai is a 65 y.o. female who presents with eye irritation and other concerns. Left eye irritation started last  night with an intermittent foreign body sensation. This morning eye was goopy and red. It has become more red as the day has gone on. Mild ongoing drainage/goopiness and tearing. No light sensitivity. She has history of year round allergies. Occasional cough, occasional nasal drainage. No definite fever or chills. Does not feel ill - until today - a little under the weather today. No ear pain or ST, no HA or vertigo. No N/v/D. No rash. No urinary sxs. No right eye sxs. No joint pains. No CP or HA or palpitations, no leg swelling or calf tenderness.   Reports high blood pressure in social situations due to history of speech impediment.   Never smoker.     Allergies   Allergen Reactions     Dust [Other Environmental Allergy]      Other Drug Allergy (See Comments)      Multiple chemical sensitivity     Pollens Extract      Tree Pollen-Kalee      Current Outpatient Medications on File Prior to Visit   Medication Sig Dispense Refill     lisinopriL (PRINIVIL,ZESTRIL) 20 MG tablet Take 1 tablet (20 mg total) by mouth daily. 90 tablet 0     PARoxetine (PAXIL) 10 MG tablet Take 1 tablet (10 mg total) by mouth daily. 90 tablet 0     rosuvastatin (CRESTOR) 10 MG tablet Take 1 tablet (10 mg total) by mouth at bedtime. 90 tablet 0     No current facility-administered medications on file prior to visit.      Patient Active Problem List   Diagnosis     Chronic fatigue syndrome     Posttraumatic stress disorder     Multiple Environmental Allergies     Chronic Pulpitis     Essential Hypercholesterolemia     Fibromyalgia     Benign Essential Hypertension     Anxiety and depression     Lump Or Mass In Breast     Schizoaffective disorder, bipolar type (H)     Acute chest pain     Fibromyalgia     Hyperlipidemia LDL goal <100     Primary hypertension     Personality disorder (H)     Dyslipidemia     Pneumonia due to infectious organism, unspecified laterality, unspecified part of lung     Near syncope       Objective:     BP (!)  196/112 (Patient Site: Right Arm, Patient Position: Sitting, Cuff Size: Adult Regular)   Pulse 92   Temp 99.2  F (37.3  C) (Oral)   Resp 18   SpO2 96%     Physical  General Appearance: Alert, cooperative, no distress, AVSS  Head: Normocephalic, without obvious abnormality, atraumatic  Eyes: Conjunctiva on the right is normal. The left eye is injected both palpebral and bulbar conjunctiva. No ciliary flush. Extraocular movements are intact. PERRLA, no photosensitivity. There was mild redness under the upper lid outer side, no ulcer or apparent FB. Fluorescein stain revealed no corneal ulcer. Scant light yellow mucus.   Ears: Normal TMs and external ear canals, both ears  Nose: mild congestion.  Throat: Throat is normal.  No exudate.  No significant lesions  Neck: No adenopathy  Lungs: Clear to auscultation bilaterally, respirations unlabored  Heart: Regular rate and rhythm  Psychiatric: Patient has a normal mood and affect.

## 2021-06-10 NOTE — TELEPHONE ENCOUNTER
Erroneous encounter - See Nurse Triage note in [facility] record    Evelia Fung RN  [facility] Nurse Advisor

## 2021-06-10 NOTE — PATIENT INSTRUCTIONS - HE
"Cool compresses for the eye as needed for comfort  Erythromycin ointment 3-4 times a day left eye, may use in the right eye if needed for spread    Discharge Instructions for COVID-19 Patients  You have--or may have--COVID-19. Please follow the instructions listed below.   If you have a weakened immune system, discuss with your doctor any other actions you need to take.  How can I protect others?  If you have symptoms (fever, cough, body aches or trouble breathing):    Stay home and away from others (self-isolate) until:  ? At least 10 days have passed since your symptoms started. And   ? You've had no fever--and no medicine that reduces fever--for 3 full days (72 hours). And   ? Your other symptoms have resolved (gotten better).  If you don't show symptoms, but testing showed that you have COVID-19:    Stay home and away from others (self-isolate) until at least 10 days have passed since the date of your first positive COVID-19 test.  During this time    Stay in your own room, even for meals. Use your own bathroom if you can.    Stay away from others in your home. No hugging, kissing or shaking hands. No visitors.    Don't go to work, school or anywhere else.    Clean \"high touch\" surfaces often (doorknobs, counters, handles). Use household cleaning spray or wipes. You'll find a full list of  on the EPA website: www.epa.gov/pesticide-registration/list-n-disinfectants-use-against-sars-cov-2.    Cover your mouth and nose with a mask, tissue or wash cloth to avoid spreading germs.    Wash your hands and face often. Use soap and water.    Caregivers in these groups are at risk for severe illness due to COVID-19:  ? People 65 years and older  ? People who live in a nursing home or long-term care facility  ? People with chronic disease (lung, heart, cancer, diabetes, kidney, liver, immunologic)  ? People who have a weakened immune system, including those who:    Are in cancer treatment    Take medicine that " weakens the immune system, such as corticosteroids    Had a bone marrow or organ transplant    Have an immune deficiency    Have poorly controlled HIV or AIDS    Are obese (body mass index of 40 or higher)    Smoke regularly    Caregivers should wear gloves while washing dishes, handling laundry and cleaning bedrooms and bathrooms.    Use caution when washing and drying laundry: Don't shake dirty laundry and use the warmest water setting that you can.    For more tips on managing your health at home, go to www.cdc.gov/coronavirus/2019-ncov/downloads/10Things.pdf.  How can I take care of myself at home?  1. Get lots of rest. Drink extra fluids (unless a doctor has told you not to).  2. Take Tylenol (acetaminophen) for fever or pain. If you have liver or kidney problems, ask your family doctor if it's okay to take Tylenol.     Adults can take either:  ? 650 mg (two 325 mg pills) every 4 to 6 hours, or   ? 1,000 mg (two 500 mg pills) every 8 hours as needed.  ? Note: Don't take more than 3,000 mg in one day. Acetaminophen is found in many medicines (both prescribed and over-the-counter medicines). Read all labels to be sure you don't take too much.  For children, check the Tylenol bottle for the right dose. The dose is based on the child's age or weight.  3. If you have other health problems (like cancer, heart failure, an organ transplant or severe kidney disease): Call your specialty clinic if you don't feel better in the next 2 days.  4. Know when to call 911. Emergency warning signs include:  ? Trouble breathing or shortness of breath  ? Pain or pressure in the chest that doesn't go away  ? Feeling confused like you haven't felt before, or not being able to wake up  ? Bluish-colored lips or face  5. Your doctor may have prescribed a blood thinner medicine. Follow their instructions.  Where can I get more information?    [facility] - About COVID-19:   [email or web address]    [facility] - What to Do If  You're Sick: www.cdc.gov/coronavirus/2019-ncov/about/steps-when-sick.html    CDC - Ending Home Isolation: www.cdc.gov/coronavirus/2019-ncov/hcp/disposition-in-home-patients.html    CDC - Caring for Someone: www.cdc.gov/coronavirus/2019-ncov/if-you-are-sick/care-for-someone.html    [facility] - Interim Guidance for Hospital Discharge to Home: www.[facility].mn./diseases/coronavirus/hcp/hospdischarge.pdf    [facility] clinical trials (COVID-19 research studies): clinicalaffairs.[facility]-clinical-trials    Below are the COVID-19 hotlines at the [place] Department of Health ([facility]). Interpreters are available.  ? For health questions: Call 111-890-4441 or 1-146.754.1608 (7 a.m. to 7 p.m.)  ? For questions about schools and childcare: Call 905-889-4553 or 1-674.183.9637 (7 a.m. to 7 p.m.)    For informational purposes only. Not to replace the advice of your health care provider. Clinically reviewed by the Infection Prevention Team.Copyright   2020 [facility]. All rights reserved. [employer] [id number] - 06/20.

## 2021-06-10 NOTE — PROGRESS NOTES
Assessment/Plan:   Conjunctivitis of left eye  Abnormal urine  Contusion of left great toe without damage to nail, initial encounter  Elevated blood pressure  Ongoing redness left eye without pain or photosensitivity or change in vision. She reports sensitivity to medications in the eye. No sign of ongoing bacterial infection, no crusting or matter. Will try preservative free lubricating drops and referral to [facility].   No xray or treatment needed for contusion to the left great toe - recommend ice.   UA is fairly clear, trace LE and few bacteria, UC pending. No protein or glucose or blood. Reassurance.   - carboxymethylcellulose (REFRESH PLUS) 0.5 % Dpet ophthalmic dropperette; Apply as needed for eye irritation  Dispense: 30 each; Refill: 2  - Ambulatory referral to Ophthalmology  - Urinalysis-UC if Indicated  - Culture, Urine    Warm and cool compresses to the eye as needed  Eye lubrication preservative free as needed  See ophthalmologist if not improving.     Urine culture pending - we will call if it shows infection    Recheck toe if worse or no better.     Follow up with primary care regarding blood pressure.     Subjective:      Yodit Lai is a 65 y.o. female who presents for evaluation of a few concerns. The first is regarding a recheck for her irritated red eye that she was seen for a week ago. The eye did get better and then she thought the erythromycin ointment was causing irritation and that maybe the fluorescein dye caused more irritations since she has many sensitivities to chemicals and preservatives. She has been evaluated in the past for dry eyes but she did not tolerate the drops due to her sensitivities. She rode in a car two days ago that was really nicol and dirty and the A/C was blowing hard in her left eye and then it became red again. No itch, less of a foreign body sensation than before. It is watery but not goopy. No mattering. No photosensitivity. No loss of vision or true eye pain.  No  headache. No right eye symptoms. She denies floaters, flashing lights, pressure in the eye.   Covid test done last week was negative.   She has not yet followed up with primary care regarding her blood pressure.   No fever or chills, no sinus or nasal congestions. No CP or palpitations. No leg swelling or calf pain. No HA.   She is also concerned about her urine. She reports that she has noticed that there are bubbles in the toilet when she goes. No blood, no cloudiness, no urgency or frequency or dysuria. No vaginal symptoms.   Lastly, she stubbed her left great toe hard against the riser of stairs. It is black and blue. No pain. She has had fusion or other similar surgery of that toe in the past so there is no motion at the toe but that is not new. No numbness.   Never smoker.     Allergies   Allergen Reactions     Dust [Other Environmental Allergy]      Other Drug Allergy (See Comments)      Multiple chemical sensitivity     Pollens Extract      Tree Pollen-Kalee      Current Outpatient Medications on File Prior to Visit   Medication Sig Dispense Refill     lisinopriL (PRINIVIL,ZESTRIL) 20 MG tablet Take 1 tablet (20 mg total) by mouth daily. 90 tablet 0     PARoxetine (PAXIL) 10 MG tablet Take 1 tablet (10 mg total) by mouth daily. 90 tablet 0     rosuvastatin (CRESTOR) 10 MG tablet Take 1 tablet (10 mg total) by mouth at bedtime. 90 tablet 0     No current facility-administered medications on file prior to visit.      Patient Active Problem List   Diagnosis     Chronic fatigue syndrome     Posttraumatic stress disorder     Multiple Environmental Allergies     Chronic Pulpitis     Essential Hypercholesterolemia     Fibromyalgia     Benign Essential Hypertension     Anxiety and depression     Lump Or Mass In Breast     Schizoaffective disorder, bipolar type (H)     Acute chest pain     Fibromyalgia     Hyperlipidemia LDL goal <100     Primary hypertension     Personality disorder (H)     Dyslipidemia      Pneumonia due to infectious organism, unspecified laterality, unspecified part of lung     Near syncope       Objective:     /81 (Patient Site: Left Arm, Patient Position: Sitting, Cuff Size: Adult Large)   Pulse 88   Temp 98.8  F (37.1  C) (Oral)   Resp 20   Wt 210 lb 3 oz (95.3 kg)   SpO2 92%   Breastfeeding No   BMI 32.92 kg/m      Physical  General Appearance: Alert, cooperative, no distress, blood pressure elevated.   Head: Normocephalic, without obvious abnormality, atraumatic  Eyes: Conjunctiva on the right normal. Left eye is very injected - red palpebral and bulbar conjunctiva. No ciliary flush, no photosensitivity. Extraocular movements are intact. PERRLA.   Nose: No significant congestion.  Throat: Throat is normal.  No exudate.  No significant lesions  Neck: No adenopathy  Lungs: Clear to auscultation bilaterally, respirations unlabored  Heart: Regular rate and rhythm  Extremities: No lower extremity edema. Left great toe has ecchymosis but no break in the skin or nail. Not painful, no numbness. Postop changes and limited mobility.   Skin: no rashes or lesions  Psychiatric: Patient has a normal mood and affect.        Recent Results (from the past 24 hour(s))   Urinalysis-UC if Indicated   Result Value Ref Range    Color, UA Yellow Colorless, Yellow, Straw, Light Yellow    Clarity, UA Clear Clear    Glucose, UA Negative Negative    Bilirubin, UA Negative Negative    Ketones, UA Negative Negative    Specific Gravity, UA <=1.005 1.005 - 1.030    Blood, UA Negative Negative    pH, UA 5.5 5.0 - 8.0    Protein, UA Negative Negative mg/dL    Urobilinogen, UA 0.2 E.U./dL 0.2 E.U./dL, 1.0 E.U./dL    Nitrite, UA Negative Negative    Leukocytes, UA Trace (!) Negative    Bacteria, UA Few (!) None Seen hpf    RBC, UA None Seen None Seen, 0-2 hpf    WBC, UA 0-5 None Seen, 0-5 hpf    Squam Epithel, UA 0-5 None Seen, 0-5 lpf

## 2021-06-10 NOTE — PATIENT INSTRUCTIONS - HE
Warm and cool compresses to the eye as needed  Eye lubrication preservative free as needed  See ophthalmologist if not improving.     Urine culture pending - we will call if it shows infection    Recheck toe if worse or no better.

## 2021-06-10 NOTE — TELEPHONE ENCOUNTER
Refill Request  Did you contact pharmacy: Yes  Medication name:   Requested Prescriptions     Pending Prescriptions Disp Refills     PARoxetine (PAXIL) 10 MG tablet 90 tablet 0     Sig: Take 1 tablet (10 mg total) by mouth daily.     Who prescribed the medication: Vinita Ortega MD  Requested [facility]acy: [facility]  Is patient out of medication: No.  5 days left  Patient notified refills processed in 3 business days:  yes  Okay to leave a detailed message: yes

## 2021-06-10 NOTE — TELEPHONE ENCOUNTER
Refill Approved    Rx renewed per Medication Renewal Policy. Medication was last renewed on 62/20.    Jennifer Almonte, [facility] Connection Triage/Med Refill 8/27/2020     Requested Prescriptions   Pending Prescriptions Disp Refills     PARoxetine (PAXIL) 10 MG tablet 90 tablet 0     Sig: Take 1 tablet (10 mg total) by mouth daily.       SSRI Refill Protocol  Passed - 8/25/2020  8:47 AM        Passed - PCP or prescribing provider visit in last year     Last office visit with prescriber/PCP: 12/10/2019 Vinita Ortega MD OR same dept: 12/10/2019 Vinita Ortega MD OR same specialty: 12/10/2019 Vinita Ortega MD  Last physical: 5/14/2019 Last MTM visit: Visit date not found   Next visit within 3 mo: Visit date not found  Next physical within 3 mo: Visit date not found  Prescriber OR PCP: Vinita Ortega MD  Last diagnosis associated with med order: 1. Symptomatic menopausal or female climacteric states  - PARoxetine (PAXIL) 10 MG tablet; Take 1 tablet (10 mg total) by mouth daily.  Dispense: 90 tablet; Refill: 0    If protocol passes may refill for 12 months if within 3 months of last provider visit (or a total of 15 months).

## 2021-06-11 NOTE — TELEPHONE ENCOUNTER
Refill Approved    Rx renewed per Medication Renewal Policy. Medication was last renewed on 6/12/20.    Jennifer Almonte, [facility] Connection Triage/Med Refill 9/25/2020     Requested Prescriptions   Pending Prescriptions Disp Refills     lisinopriL (PRINIVIL,ZESTRIL) 20 MG tablet 90 tablet 0     Sig: Take 1 tablet (20 mg total) by mouth daily.       [employer]ce Inhibitors Refill Protocol Passed - 9/25/2020  1:07 PM        Passed - PCP or prescribing provider visit in past 12 months       Last office visit with prescriber/PCP: 12/10/2019 Vinita Ortega MD OR same dept: 12/10/2019 Vinita Ortega MD OR same specialty: 12/10/2019 Vinita Ortega MD  Last physical: 5/14/2019 Last MTM visit: Visit date not found   Next visit within 3 mo: Visit date not found  Next physical within 3 mo: Visit date not found  Prescriber OR PCP: Vinita Ortega MD  Last diagnosis associated with med order: 1. Benign Essential Hypertension  - lisinopriL (PRINIVIL,ZESTRIL) 20 MG tablet; Take 1 tablet (20 mg total) by mouth daily.  Dispense: 90 tablet; Refill: 0    If protocol passes may refill for 12 months if within 3 months of last provider visit (or a total of 15 months).             Passed - Serum Potassium in past 12 months     Lab Results   Component Value Date    Potassium 4.7 01/08/2020             Passed - Blood pressure filed in past 12 months     BP Readings from Last 1 Encounters:   08/16/20 146/90             Passed - Serum Creatinine in past 12 months     Creatinine   Date Value Ref Range Status   01/08/2020 1.01 0.60 - 1.10 mg/dL Final

## 2021-06-11 NOTE — TELEPHONE ENCOUNTER
Refill Approved    Rx renewed per Medication Renewal Policy. Medication was last renewed on 6/2/20.    Jennifer Almonte, [facility] [place] Connection Triage/Med Refill 8/31/2020     Requested Prescriptions   Pending Prescriptions Disp Refills     rosuvastatin (CRESTOR) 10 MG tablet [Pharmacy Med Name: ROSUVASTATIN 10MG TABLETS] 90 tablet 0     Sig: TAKE 1 TABLET(10 MG) BY MOUTH AT BEDTIME       Statins Refill Protocol (Hmg CoA Reductase Inhibitors) Passed - 8/26/2020  7:45 PM        Passed - PCP or prescribing provider visit in past 12 months      Last office visit with prescriber/PCP: 12/10/2019 Vinita Ortega MD OR same dept: Visit date not found OR same specialty: Visit date not found  Last physical: 5/14/2019 Last MTM visit: Visit date not found   Next visit within 3 mo: Visit date not found  Next physical within 3 mo: Visit date not found  Prescriber OR PCP: Vinita Ortega MD  Last diagnosis associated with med order: 1. Hyperlipidemia  - rosuvastatin (CRESTOR) 10 MG tablet [Pharmacy Med Name: ROSUVASTATIN 10MG TABLETS]; TAKE 1 TABLET(10 MG) BY MOUTH AT BEDTIME  Dispense: 90 tablet; Refill: 0    2. Symptomatic menopausal or female climacteric states  - PARoxetine (PAXIL) 10 MG tablet [Pharmacy Med Name: PAROXETINE 10MG TABLETS]; TAKE 1 TABLET(10 MG) BY MOUTH DAILY  Dispense: 90 tablet; Refill: 0    If protocol passes may refill for 12 months if within 3 months of last provider visit (or a total of 15 months).                PARoxetine (PAXIL) 10 MG tablet [Pharmacy Med Name: PAROXETINE 10MG TABLETS] 90 tablet 0     Sig: TAKE 1 TABLET(10 MG) BY MOUTH DAILY       SSRI Refill Protocol  Passed - 8/26/2020  7:45 PM        Passed - PCP or prescribing provider visit in last year     Last office visit with prescriber/PCP: 12/10/2019 Vinita Ortega MD OR same dept: Visit date not found OR same specialty: Visit date not found  Last physical: 5/14/2019 Last MTM visit: Visit date not found   Next visit within 3 mo: Visit date not found   Next physical within 3 mo: Visit date not found  Prescriber OR PCP: Vinita Ortega MD  Last diagnosis associated with med order: 1. Hyperlipidemia  - rosuvastatin (CRESTOR) 10 MG tablet [Pharmacy Med Name: ROSUVASTATIN 10MG TABLETS]; TAKE 1 TABLET(10 MG) BY MOUTH AT BEDTIME  Dispense: 90 tablet; Refill: 0    2. Symptomatic menopausal or female climacteric states  - PARoxetine (PAXIL) 10 MG tablet [Pharmacy Med Name: PAROXETINE 10MG TABLETS]; TAKE 1 TABLET(10 MG) BY MOUTH DAILY  Dispense: 90 tablet; Refill: 0    If protocol passes may refill for 12 months if within 3 months of last provider visit (or a total of 15 months).

## 2021-06-11 NOTE — TELEPHONE ENCOUNTER
Refill Request  Did you contact pharmacy: Yes  Medication name:   Requested Prescriptions     Pending Prescriptions Disp Refills     lisinopriL (PRINIVIL,ZESTRIL) 20 MG tablet 90 tablet 0     Sig: Take 1 tablet (20 mg total) by mouth daily.     Who prescribed the medication: Vinita Orteag MD   Requested Pharmacy: [facility] #[id number]  Is patient out of medication: No.  3 days left  Patient notified refills processed in 3 business days:  no  Okay to leave a detailed message: yes  [phone]    FYI: Patient stated she has requested this medication refill for the past 2 weeks with no response.

## 2021-06-12 NOTE — ANESTHESIA POSTPROCEDURE EVALUATION
Patient: Yodit Lai  * No procedures listed *  Anesthesia type: general    Patient location: PACU  Last vitals:   Vitals:    09/08/17 0900   BP: (P) 127/81   Pulse: (P) 75   Resp: (P) 20   Temp: (P) 36.5  C (97.7  F)   SpO2: (P) 95%     Post vital signs: stable  Level of consciousness: awake and responds to simple questions  Post-anesthesia pain: pain controlled  Post-anesthesia nausea and vomiting: no  Pulmonary: unassisted, return to baseline  Cardiovascular: stable and blood pressure at baseline  Hydration: adequate  Anesthetic events: no    QCDR Measures:  ASA# 11 - Jessie-op Cardiac Arrest: ASA11B - Patient did NOT experience unanticipated cardiac arrest  ASA# 12 - Jessie-op Mortality Rate: ASA12B - Patient did NOT die  ASA# 13 - PACU Re-Intubation Rate: ASA13B - Patient did NOT require a new airway mgmt  ASA# 10 - Composite Anes Safety: ASA10A - No serious adverse event    Additional Notes:

## 2021-06-12 NOTE — ANESTHESIA PREPROCEDURE EVALUATION
Anesthesia Evaluation      Patient summary reviewed     Airway    Pulmonary    (+) asthma  sleep apnea,                          Cardiovascular   (+) hypertension, ,      Neuro/Psych    (+) neuromuscular disease,      Endo/Other       GI/Hepatic/Renal    (+)   chronic renal disease,      Other findings:                Obstructive Sleep Apnea     Chronic Fatigue Syndrome    Fracture Of The Upper Limb    Posttraumatic stress disorder    Multiple Environmental Allergies    Chronic Pulpitis    Essential Hypercholesterolemia    Chronic Renal Failure    Fibromyalgia    Edema    Mild Intermittent Asthma    Benign Essential Hypertension    Depression    Lump Or Mass In Breast    Schizoaffective disorder, bipolar type          Dental                         Anesthesia Plan  Planned anesthetic: general mask and total IV anesthesia    ASA 3   Induction: intravenous   Anesthetic plan and risks discussed with: patient    Post-op plan: routine recovery

## 2021-06-12 NOTE — ANESTHESIA POSTPROCEDURE EVALUATION
Patient: Yodit Lai  * No procedures listed *  Anesthesia type: general    Patient location: PACU  Last vitals:   Vitals:    08/25/17 0925   BP: 163/89   Pulse: 75   Resp:    Temp:    SpO2: 99%     Post vital signs: stable  Level of consciousness: awake and responds to simple questions  Post-anesthesia pain: pain controlled  Post-anesthesia nausea and vomiting: no  Pulmonary: unassisted, return to baseline  Cardiovascular: stable and blood pressure at baseline  Hydration: adequate  Anesthetic events: no    QCDR Measures:  ASA# 11 - Jessie-op Cardiac Arrest: ASA11B - Patient did NOT experience unanticipated cardiac arrest  ASA# 12 - Jessie-op Mortality Rate: ASA12B - Patient did NOT die  ASA# 13 - PACU Re-Intubation Rate: NA - No ETT / LMA used for case  ASA# 10 - Composite Anes Safety: ASA10A - No serious adverse event  ASA# 38 - New Corneal Injury: ASA38A - No new exposure keratitis or corneal abrasion in PACU    Additional Notes:

## 2021-06-12 NOTE — ANESTHESIA POSTPROCEDURE EVALUATION
Patient: Yodit aLi  * No procedures listed *  Anesthesia type: general    Patient location: PACU  Last vitals:   Vitals:    08/16/17 0817   BP: (!) 159/104   Pulse: 81   Resp: 18   Temp: 37  C (98.6  F)   SpO2: 93%     Post vital signs: stable  Level of consciousness: awake, alert and oriented  Post-anesthesia pain: pain controlled  Post-anesthesia nausea and vomiting: no  Pulmonary: unassisted, nasal cannula  Cardiovascular: stable and blood pressure at baseline  Hydration: adequate  Anesthetic events: no    QCDR Measures:  ASA# 11 - Jessie-op Cardiac Arrest: ASA11B - Patient did NOT experience unanticipated cardiac arrest  ASA# 12 - Jessie-op Mortality Rate: ASA12B - Patient did NOT die  ASA# 13 - PACU Re-Intubation Rate: NA - No ETT / LMA used for case  ASA# 10 - Composite Anes Safety: ASA10A - No serious adverse event  ASA# 38 - New Corneal Injury: ASA38A - No new exposure keratitis or corneal abrasion in PACU    Additional Notes:

## 2021-06-12 NOTE — ANESTHESIA CARE TRANSFER NOTE
Last vitals:   Vitals:    09/01/17 0915   BP: (!) 180/100   Pulse: 87   Resp: 16   Temp: 36.9  C (98.4  F)   SpO2: 95%     Patient's level of consciousness is drowsy  Spontaneous respirations: yes  Maintains airway independently: yes  Dentition unchanged: yes  Oropharynx: oropharynx clear of all foreign objects    QCDR Measures:  ASA# 20 - Surgical Safety Checklist: WHO surgical safety checklist completed prior to induction  PQRS# 430 - Adult PONV Prevention: NA - Not adult patient, not GA or 3 or more risk factors NOT present  ASA# 8 - Peds PONV Prevention: NA - Not pediatric patient, not GA or 2 or more risk factors NOT present  PQRS# 424 - Jessie-op Temp Management: 4559F - At least one body temp DOCUMENTED => 35.5C or 95.9F within required timeframe  PQRS# 426 - PACU Transfer Protocol: - Transfer of care checklist used  ASA# 14 - Acute Post-op Pain: ASA14B - Patient did NOT experience pain >= 7 out of 10

## 2021-06-12 NOTE — ANESTHESIA POSTPROCEDURE EVALUATION
Patient: Yodit Lai  * No procedures listed *  Anesthesia type: general    Patient location: PACU  Last vitals:   Vitals:    08/11/17 0840   BP: (!) 180/109   Pulse: 80   Resp: 20   Temp:    SpO2: 99%     Post vital signs: stable  Level of consciousness: awake, alert and responds to simple questions  Post-anesthesia pain: pain controlled  Post-anesthesia nausea and vomiting: no  Pulmonary: unassisted, return to baseline  Cardiovascular: stable and blood pressure at baseline  Hydration: adequate  Anesthetic events: no    QCDR Measures:  ASA# 11 - Jessie-op Cardiac Arrest: ASA11B - Patient did NOT experience unanticipated cardiac arrest  ASA# 12 - Jessie-op Mortality Rate: ASA12B - Patient did NOT die  ASA# 13 - PACU Re-Intubation Rate: NA - No ETT / LMA used for case  ASA# 10 - Composite Anes Safety: ASA10A - No serious adverse event  ASA# 38 - New Corneal Injury: ASA38A - No new exposure keratitis or corneal abrasion in PACU    Additional Notes:

## 2021-06-12 NOTE — ANESTHESIA POSTPROCEDURE EVALUATION
Patient: Yodit Lai  * No procedures listed *  Anesthesia type: general    Patient location: PACU  Last vitals:   Vitals:    08/30/17 0930   BP: 128/78   Pulse: 81   Resp: 16   Temp:    SpO2: 92%     Post vital signs: stable  Level of consciousness: awake and responds to simple questions  Post-anesthesia pain: pain controlled  Post-anesthesia nausea and vomiting: no  Pulmonary: unassisted, return to baseline  Cardiovascular: stable and blood pressure at baseline  Hydration: adequate  Anesthetic events: no    QCDR Measures:  ASA# 11 - Jessie-op Cardiac Arrest: ASA11B - Patient did NOT experience unanticipated cardiac arrest  ASA# 12 - Jessie-op Mortality Rate: ASA12B - Patient did NOT die  ASA# 13 - PACU Re-Intubation Rate: NA - No ETT / LMA used for case  ASA# 10 - Composite Anes Safety: ASA10A - No serious adverse event  ASA# 38 - New Corneal Injury: ASA38A - No new exposure keratitis or corneal abrasion in PACU    Additional Notes:

## 2021-06-12 NOTE — ANESTHESIA POSTPROCEDURE EVALUATION
Patient: Yodit Lai  * No procedures listed *  Anesthesia type: general    Patient location: PACU  Last vitals:   Vitals:    09/01/17 0930   BP: 165/90   Pulse: 79   Resp: 16   Temp:    SpO2: 98%     Post vital signs: stable  Level of consciousness: awake and responds to simple questions  Post-anesthesia pain: pain controlled  Post-anesthesia nausea and vomiting: no  Pulmonary: unassisted, return to baseline  Cardiovascular: stable and blood pressure at baseline  Hydration: adequate  Anesthetic events: no    QCDR Measures:  ASA# 11 - Jessie-op Cardiac Arrest: ASA11B - Patient did NOT experience unanticipated cardiac arrest  ASA# 12 - Jessie-op Mortality Rate: ASA12B - Patient did NOT die  ASA# 13 - PACU Re-Intubation Rate: NA - No ETT / LMA used for case  ASA# 10 - Composite Anes Safety: ASA10A - No serious adverse event    Additional Notes:

## 2021-06-12 NOTE — ANESTHESIA CARE TRANSFER NOTE
Last vitals:   Vitals:    08/16/17 0817   BP: (!) 159/104   Pulse: 81   Resp: 18   Temp: 37  C (98.6  F)   SpO2: 93%     Patient's level of consciousness is awake  Spontaneous respirations: yes  Maintains airway independently: yes  Dentition unchanged: yes  Oropharynx: oropharynx clear of all foreign objects    QCDR Measures:  ASA# 20 - Surgical Safety Checklist: WHO surgical safety checklist completed prior to induction  PQRS# 430 - Adult PONV Prevention: 4558F-8P - Pt did NOT receive => 2 anti-emetic agents  ASA# 8 - Peds PONV Prevention: NA - Not pediatric patient, not GA or 2 or more risk factors NOT present  PQRS# 424 - Jessie-op Temp Management: NA - MAC anesthesia or case < 60 minutes  PQRS# 426 - PACU Transfer Protocol: - Transfer of care checklist used  ASA# 14 - Acute Post-op Pain: ASA14B - Patient did NOT experience pain >= 7 out of 10

## 2021-06-12 NOTE — ANESTHESIA CARE TRANSFER NOTE
Last vitals:   Vitals:    08/09/17 0922   BP: (!) 174/99   Pulse: 84   Resp: 16   Temp: 37.2  C (98.9  F)   SpO2: 96%     Patient's level of consciousness is drowsy  Spontaneous respirations: yes  Maintains airway independently: yes  Dentition unchanged: yes  Oropharynx: oropharynx clear of all foreign objects    QCDR Measures:  ASA# 20 - Surgical Safety Checklist: WHO surgical safety checklist completed prior to induction  PQRS# 430 - Adult PONV Prevention: 4558F-8P - Pt did NOT receive => 2 anti-emetic agents  ASA# 8 - Peds PONV Prevention: NA - Not pediatric patient, not GA or 2 or more risk factors NOT present  PQRS# 424 - Jessie-op Temp Management: 4559F - At least one body temp DOCUMENTED => 35.5C or 95.9F within required timeframe  PQRS# 426 - PACU Transfer Protocol: - Transfer of care checklist used  ASA# 14 - Acute Post-op Pain: ASA14B - Patient did NOT experience pain >= 7 out of 10

## 2021-06-12 NOTE — TELEPHONE ENCOUNTER
CMT received and reviewed ALESSIA from mental health provider. Asking for progress notes from 06/01/20-09/01/20. The only office notes matching this criteria in patient chart are three notes. These were returned to requester via fax per notes below.     ALESSIA scanned to chart. Thanks.

## 2021-06-12 NOTE — ANESTHESIA CARE TRANSFER NOTE
Last vitals:   Vitals:    08/30/17 0906   BP:    Pulse: 84   Resp: 12   Temp:    SpO2: 96%     Patient's level of consciousness is drowsy  Spontaneous respirations: yes  Maintains airway independently: yes  Dentition unchanged: yes  Oropharynx: oropharynx clear of all foreign objects    QCDR Measures:  ASA# 20 - Surgical Safety Checklist: WHO surgical safety checklist completed prior to induction  PQRS# 430 - Adult PONV Prevention: NA - Not adult patient, not GA or 3 or more risk factors NOT present  ASA# 8 - Peds PONV Prevention: NA - Not pediatric patient, not GA or 2 or more risk factors NOT present  PQRS# 424 - Jessie-op Temp Management: 4559F - At least one body temp DOCUMENTED => 35.5C or 95.9F within required timeframe  PQRS# 426 - PACU Transfer Protocol: - Transfer of care checklist used  ASA# 14 - Acute Post-op Pain: ASA14B - Patient did NOT experience pain >= 7 out of 10

## 2021-06-12 NOTE — TELEPHONE ENCOUNTER
Called and notified caller that she must re-send to [facility] (calvin Meek) at [phone].     Requesting updated medication list and progress notes over the past two months. Please return to [phone].

## 2021-06-12 NOTE — ANESTHESIA CARE TRANSFER NOTE
Last vitals:   Vitals:    09/08/17 0846   BP: 133/74   Pulse: 85   Resp: 16   Temp: 36.5  C (97.7  F)   SpO2: 97%     Patient's level of consciousness is awake  Spontaneous respirations: yes  Maintains airway independently: yes  Dentition unchanged: yes  Oropharynx: oropharynx clear of all foreign objects    QCDR Measures:  ASA# 20 - Surgical Safety Checklist: WHO surgical safety checklist completed prior to induction  PQRS# 430 - Adult PONV Prevention: NA - Not adult patient, not GA or 3 or more risk factors NOT present  ASA# 8 - Peds PONV Prevention: NA - Not pediatric patient, not GA or 2 or more risk factors NOT present  PQRS# 424 - Jessie-op Temp Management: 4559F - At least one body temp DOCUMENTED => 35.5C or 95.9F within required timeframe  PQRS# 426 - PACU Transfer Protocol: - Transfer of care checklist used  ASA# 14 - Acute Post-op Pain: ASA14B - Patient did NOT experience pain >= 7 out of 10

## 2021-06-12 NOTE — ANESTHESIA PREPROCEDURE EVALUATION
Anesthesia Evaluation      Patient summary reviewed     Airway   Mallampati: III  Neck ROM: full   Pulmonary - normal exam   (+) asthma  sleep apnea,                          Cardiovascular   Exercise tolerance: > or = 4 METS  (+) hypertension, ,     Rhythm: regular  Rate: normal,         Neuro/Psych    (+) neuromuscular disease,  depression,     Endo/Other    (+) obesity,      GI/Hepatic/Renal    (+)   chronic renal disease,      Other findings:                Obstructive Sleep Apnea     Chronic Fatigue Syndrome    Fracture Of The Upper Limb    Posttraumatic stress disorder    Multiple Environmental Allergies    Chronic Pulpitis    Essential Hypercholesterolemia    Chronic Renal Failure    Fibromyalgia    Edema    Mild Intermittent Asthma    Benign Essential Hypertension    Depression    Lump Or Mass In Breast    Schizoaffective disorder, bipolar type          Dental - normal exam                 Chemistry        Component Value Date/Time     08/13/2017 0759    K 4.5 08/13/2017 0759     08/13/2017 0759    CO2 28 08/13/2017 0759    BUN 27 (H) 08/13/2017 0759    CREATININE 0.94 08/13/2017 0759     08/13/2017 0759        Component Value Date/Time    CALCIUM 9.7 08/13/2017 0759    ALKPHOS 93 06/01/2017 1628    AST 27 06/01/2017 1628    ALT 26 06/01/2017 1628    BILITOT 0.2 06/01/2017 1628                     Anesthesia Plan  Planned anesthetic: general mask    ASA 4   Induction: intravenous   Anesthetic plan and risks discussed with: patient    Post-op plan: routine recovery

## 2021-06-12 NOTE — ANESTHESIA CARE TRANSFER NOTE
Last vitals:   Vitals:    08/02/17 0936   BP: (!) 204/107   Pulse: 90   Resp: 16   Temp:    SpO2: 97%     Patient's level of consciousness is drowsy  Spontaneous respirations: yes  Maintains airway independently: yes  Dentition unchanged: yes  Oropharynx: oropharynx clear of all foreign objects    QCDR Measures:  ASA# 20 - Surgical Safety Checklist: WHO surgical safety checklist completed prior to induction  PQRS# 430 - Adult PONV Prevention: 4558F - Pt received => 2 anti-emetic agents (different classes) preop & intraop  ASA# 8 - Peds PONV Prevention: NA - Not pediatric patient, not GA or 2 or more risk factors NOT present  PQRS# 424 - Jessie-op Temp Management: 4559F - At least one body temp DOCUMENTED => 35.5C or 95.9F within required timeframe  PQRS# 426 - PACU Transfer Protocol: - Transfer of care checklist used  ASA# 14 - Acute Post-op Pain: ASA14B - Patient did NOT experience pain >= 7 out of 10

## 2021-06-12 NOTE — ANESTHESIA POSTPROCEDURE EVALUATION
Patient: Yodit Lai  * No procedures listed *  Anesthesia type: general    Patient location: PACU  Last vitals:   Vitals:    08/02/17 1000   BP: 172/89   Pulse: 87   Resp:    Temp:    SpO2: 94%     Post vital signs: stable  Level of consciousness: awake and responds to simple questions  Post-anesthesia pain: pain controlled  Post-anesthesia nausea and vomiting: no  Pulmonary: unassisted, return to baseline  Cardiovascular: stable and blood pressure at baseline  Hydration: adequate  Anesthetic events: no    QCDR Measures:  ASA# 11 - Jessie-op Cardiac Arrest: ASA11B - Patient did NOT experience unanticipated cardiac arrest  ASA# 12 - Jessie-op Mortality Rate: ASA12B - Patient did NOT die  ASA# 13 - PACU Re-Intubation Rate: NA - No ETT / LMA used for case  ASA# 10 - Composite Anes Safety: ASA10A - No serious adverse event  ASA# 38 - New Corneal Injury: ASA38A - No new exposure keratitis or corneal abrasion in PACU    Additional Notes:

## 2021-06-12 NOTE — ANESTHESIA CARE TRANSFER NOTE
Last vitals:   Vitals:    08/28/17 0903   BP: 143/86   Pulse: 82   Resp: 16   Temp:    SpO2: 97%     Patient's level of consciousness is awake and drowsy  Spontaneous respirations: yes  Maintains airway independently: yes  Dentition unchanged: yes  Oropharynx: oropharynx clear of all foreign objects    QCDR Measures:  ASA# 20 - Surgical Safety Checklist: WHO surgical safety checklist completed prior to induction  PQRS# 430 - Adult PONV Prevention: NA - Not adult patient, not GA or 3 or more risk factors NOT present  ASA# 8 - Peds PONV Prevention: NA - Not pediatric patient, not GA or 2 or more risk factors NOT present  PQRS# 424 - Jessie-op Temp Management: 4559F - At least one body temp DOCUMENTED => 35.5C or 95.9F within required timeframe  PQRS# 426 - PACU Transfer Protocol: - Transfer of care checklist used  ASA# 14 - Acute Post-op Pain: ASA14B - Patient did NOT experience pain >= 7 out of 10

## 2021-06-12 NOTE — ANESTHESIA CARE TRANSFER NOTE
Last vitals:   Vitals:    08/25/17 0921   BP:    Pulse: 73   Resp: 12   Temp: 36.7  C (98  F)   SpO2: 98%     Patient's level of consciousness is drowsy  Spontaneous respirations: yes  Maintains airway independently: yes  Dentition unchanged: yes  Oropharynx: oropharynx clear of all foreign objects    QCDR Measures:  ASA# 20 - Surgical Safety Checklist: WHO surgical safety checklist completed prior to induction  PQRS# 430 - Adult PONV Prevention: 4558F - Pt received => 2 anti-emetic agents (different classes) preop & intraop  ASA# 8 - Peds PONV Prevention: NA - Not pediatric patient, not GA or 2 or more risk factors NOT present  PQRS# 424 - Jessie-op Temp Management: 4559F - At least one body temp DOCUMENTED => 35.5C or 95.9F within required timeframe  PQRS# 426 - PACU Transfer Protocol: - Transfer of care checklist used  ASA# 14 - Acute Post-op Pain: ASA14B - Patient did NOT experience pain >= 7 out of 10

## 2021-06-12 NOTE — ANESTHESIA CARE TRANSFER NOTE
Last vitals:   Vitals:    08/14/17 0841   BP: (!) 131/102   Pulse: 84   Resp: 17   Temp:    SpO2: 93%     Patient's level of consciousness is drowsy  Spontaneous respirations: yes  Maintains airway independently: yes  Dentition unchanged: yes  Oropharynx: oropharynx clear of all foreign objects    QCDR Measures:  ASA# 20 - Surgical Safety Checklist: WHO surgical safety checklist completed prior to induction  PQRS# 430 - Adult PONV Prevention: NA - Not adult patient, not GA or 3 or more risk factors NOT present  ASA# 8 - Peds PONV Prevention: NA - Not pediatric patient, not GA or 2 or more risk factors NOT present  PQRS# 424 - Jessie-op Temp Management: NA - MAC anesthesia or case < 60 minutes  PQRS# 426 - PACU Transfer Protocol: - Transfer of care checklist used  ASA# 14 - Acute Post-op Pain: ASA14B - Patient did NOT experience pain >= 7 out of 10

## 2021-06-12 NOTE — ANESTHESIA POSTPROCEDURE EVALUATION
Patient: Yodit Lai  * No procedures listed *  Anesthesia type: general    Patient location: PACU  Last vitals:   Vitals:    08/14/17 0844   BP: (!) 160/92   Pulse: 84   Resp: 14   Temp:    SpO2:      Post vital signs: stable  Level of consciousness: awake and responds to simple questions  Post-anesthesia pain: pain controlled  Post-anesthesia nausea and vomiting: no  Pulmonary: unassisted, return to baseline  Cardiovascular: stable and blood pressure at baseline  Hydration: adequate  Anesthetic events: no    QCDR Measures:  ASA# 11 - Jessie-op Cardiac Arrest: ASA11B - Patient did NOT experience unanticipated cardiac arrest  ASA# 12 - Jessie-op Mortality Rate: ASA12B - Patient did NOT die  ASA# 13 - PACU Re-Intubation Rate: NA - No ETT / LMA used for case  ASA# 10 - Composite Anes Safety: ASA10A - No serious adverse event  ASA# 38 - New Corneal Injury: ASA38A - No new exposure keratitis or corneal abrasion in PACU    Additional Notes:

## 2021-06-12 NOTE — ANESTHESIA CARE TRANSFER NOTE
Last vitals:   Vitals:    08/11/17 0830   BP: (!) 169/104   Pulse: 85   Resp: 14   Temp: 36.6  C (97.9  F)   SpO2: 98%     Patient's level of consciousness is drowsy  Spontaneous respirations: yes  Maintains airway independently: yes  Dentition unchanged: yes  Oropharynx: oropharynx clear of all foreign objects    QCDR Measures:  ASA# 20 - Surgical Safety Checklist: WHO surgical safety checklist completed prior to induction  PQRS# 430 - Adult PONV Prevention: 4558F - Pt received => 2 anti-emetic agents (different classes) preop & intraop  ASA# 8 - Peds PONV Prevention: NA - Not pediatric patient, not GA or 2 or more risk factors NOT present  PQRS# 424 - Jessie-op Temp Management: 4559F - At least one body temp DOCUMENTED => 35.5C or 95.9F within required timeframe  PQRS# 426 - PACU Transfer Protocol: - Transfer of care checklist used  ASA# 14 - Acute Post-op Pain: ASA14B - Patient did NOT experience pain >= 7 out of 10

## 2021-06-12 NOTE — TELEPHONE ENCOUNTER
Who is calling:  Iliana   Reason for Call:  Caller stated that she is needing some H & P records with office notes if Vinita Ortega MD can get that sent over to her. Caller is also faxing over some forms for Vinita Ortega MD to keep in file as well to keep an eye out for it.   Date of last appointment with primary care:   Okay to leave a detailed message: Yes

## 2021-06-12 NOTE — ANESTHESIA CARE TRANSFER NOTE
Last vitals:   Vitals:    08/07/17 0915   BP: (!) 175/98   Pulse: (!) 111   Resp: 12   Temp: 36.9  C (98.4  F)   SpO2: 98%     Patient's level of consciousness is drowsy  Spontaneous respirations: yes  Maintains airway independently: yes  Dentition unchanged: yes  Oropharynx: oropharynx clear of all foreign objects    QCDR Measures:  ASA# 20 - Surgical Safety Checklist: WHO surgical safety checklist completed prior to induction  PQRS# 430 - Adult PONV Prevention: 4558F-1P - Medical reason for NOT administering combination therapy  ASA# 8 - Peds PONV Prevention: NA - Not pediatric patient, not GA or 2 or more risk factors NOT present  PQRS# 424 - Jessie-op Temp Management: 4559F - At least one body temp DOCUMENTED => 35.5C or 95.9F within required timeframe  PQRS# 426 - PACU Transfer Protocol: - Transfer of care checklist used  ASA# 14 - Acute Post-op Pain: ASA14B - Patient did NOT experience pain >= 7 out of 10

## 2021-06-12 NOTE — ANESTHESIA POSTPROCEDURE EVALUATION
Patient: Yodit Lai  * No procedures listed *  Anesthesia type: general    Patient location: PACU  Last vitals:   Vitals:    08/07/17 0925   BP: 157/88   Pulse: (!) 106   Resp:    Temp:    SpO2: 97%     Post vital signs: stable  Level of consciousness: awake and responds to simple questions  Post-anesthesia pain: pain controlled  Post-anesthesia nausea and vomiting: no  Pulmonary: unassisted, return to baseline  Cardiovascular: stable and blood pressure at baseline  Hydration: adequate  Anesthetic events: no    QCDR Measures:  ASA# 11 - Jessie-op Cardiac Arrest: ASA11B - Patient did NOT experience unanticipated cardiac arrest  ASA# 12 - Jessie-op Mortality Rate: ASA12B - Patient did NOT die  ASA# 13 - PACU Re-Intubation Rate: ASA13B - Patient did NOT require a new airway mgmt  ASA# 10 - Composite Anes Safety: ASA10A - No serious adverse event  ASA# 38 - New Corneal Injury: ASA38A - No new exposure keratitis or corneal abrasion in PACU    Additional Notes:

## 2021-06-12 NOTE — ANESTHESIA PREPROCEDURE EVALUATION
Anesthesia Evaluation      Patient summary reviewed     Airway   Mallampati: III  Neck ROM: full   Pulmonary    (+) asthma  sleep apnea,                          Cardiovascular   Exercise tolerance: > or = 4 METS  (+) hypertension, ,     ECG reviewed        Neuro/Psych    (+) neuromuscular disease,  depression,     Endo/Other    (+) obesity,      GI/Hepatic/Renal    (+)   chronic renal disease,      Other findings:                Obstructive Sleep Apnea     Chronic Fatigue Syndrome    Fracture Of The Upper Limb    Posttraumatic stress disorder    Multiple Environmental Allergies    Chronic Pulpitis    Essential Hypercholesterolemia    Chronic Renal Failure    Fibromyalgia    Edema    Mild Intermittent Asthma    Benign Essential Hypertension    Depression    Lump Or Mass In Breast    Schizoaffective disorder, bipolar type          Dental - normal exam                          Anesthesia Plan  Planned anesthetic: general mask    ASA 4   Induction: intravenous   Anesthetic plan and risks discussed with: patient    Post-op plan: routine recovery

## 2021-06-12 NOTE — ANESTHESIA PREPROCEDURE EVALUATION
Anesthesia Evaluation      Patient summary reviewed   No history of anesthetic complications     Airway   Mallampati: III  Neck ROM: full   Pulmonary - negative ROS and normal exam    breath sounds clear to auscultation                         Cardiovascular - normal exam  Exercise tolerance: > or = 4 METS  Rhythm: regular  Rate: normal,         Neuro/Psych - negative ROS     Endo/Other    (+) obesity,      GI/Hepatic/Renal - negative ROS           Dental - normal exam   (+) poor dentition                       Anesthesia Plan  Planned anesthetic: total IV anesthesia    ASA 3   Induction: intravenous   Anesthetic plan and risks discussed with: patient    Post-op plan: routine recovery

## 2021-06-12 NOTE — ANESTHESIA POSTPROCEDURE EVALUATION
Patient: Yodit Lai  * No procedures listed *  Anesthesia type: general    Patient location: PACU  Last vitals:   Vitals:    07/31/17 0943   BP: (!) 190/102   Pulse: 92   Resp: 16   Temp: 37  C (98.6  F)   SpO2: 97%     Post vital signs: stable  Level of consciousness: awake and responds to simple questions  Post-anesthesia pain: pain controlled  Post-anesthesia nausea and vomiting: no  Pulmonary: unassisted, return to baseline  Cardiovascular: stable and blood pressure at baseline  Hydration: adequate  Anesthetic events: no    QCDR Measures:  ASA# 11 - Jessie-op Cardiac Arrest: ASA11B - Patient did NOT experience unanticipated cardiac arrest  ASA# 12 - Jessie-op Mortality Rate: ASA12B - Patient did NOT die  ASA# 13 - PACU Re-Intubation Rate: ASA13A - Patient required new airway mgmt prior to PACU D/C  ASA# 10 - Composite Anes Safety: ASA10A - No serious adverse event  ASA# 38 - New Corneal Injury: ASA38A - No new exposure keratitis or corneal abrasion in PACU    Additional Notes:

## 2021-06-12 NOTE — ANESTHESIA PREPROCEDURE EVALUATION
Anesthesia Evaluation      Patient summary reviewed     Airway   Mallampati: III  Neck ROM: full   Pulmonary - normal exam   (+) asthma  sleep apnea,                          Cardiovascular - normal exam  Exercise tolerance: > or = 4 METS  (+) hypertension, ,     ECG reviewed        Neuro/Psych    (+) neuromuscular disease,  depression,     Endo/Other    (+) obesity,      GI/Hepatic/Renal    (+)   chronic renal disease,      Other findings:                Obstructive Sleep Apnea     Chronic Fatigue Syndrome    Fracture Of The Upper Limb    Posttraumatic stress disorder    Multiple Environmental Allergies    Chronic Pulpitis    Essential Hypercholesterolemia    Chronic Renal Failure    Fibromyalgia    Edema    Mild Intermittent Asthma    Benign Essential Hypertension    Depression    Lump Or Mass In Breast    Schizoaffective disorder, bipolar type          Dental - normal exam                          Anesthesia Plan  Planned anesthetic: general mask    ASA 4   Induction: intravenous   Anesthetic plan and risks discussed with: patient    Post-op plan: routine recovery

## 2021-06-12 NOTE — ANESTHESIA POSTPROCEDURE EVALUATION
Patient: Yodit Lai  * No procedures listed *  Anesthesia type: general    Patient location: PACU  Last vitals:   Vitals:    08/09/17 0950   BP: 146/70   Pulse: 79   Resp: 16   Temp:    SpO2: 90%     Post vital signs: stable  Level of consciousness: awake and responds to simple questions  Post-anesthesia pain: pain controlled  Post-anesthesia nausea and vomiting: no  Pulmonary: unassisted, return to baseline  Cardiovascular: stable and blood pressure at baseline  Hydration: adequate  Anesthetic events: no    QCDR Measures:  ASA# 11 - Jessie-op Cardiac Arrest: ASA11B - Patient did NOT experience unanticipated cardiac arrest  ASA# 12 - Jessie-op Mortality Rate: ASA12B - Patient did NOT die  ASA# 13 - PACU Re-Intubation Rate: ASA13B - Patient did NOT require a new airway mgmt  ASA# 10 - Composite Anes Safety: ASA10A - No serious adverse event  ASA# 38 - New Corneal Injury: ASA38A - No new exposure keratitis or corneal abrasion in PACU    Additional Notes:

## 2021-06-12 NOTE — ANESTHESIA CARE TRANSFER NOTE
Last vitals:   Vitals:    07/31/17 0943   BP: (!) 190/102   Pulse: 92   Resp: 16   Temp: 37  C (98.6  F)   SpO2: 97%     Patient's level of consciousness is awake and drowsy  Spontaneous respirations: yes  Maintains airway independently: yes  Dentition unchanged: yes  Oropharynx: oropharynx clear of all foreign objects    QCDR Measures:  ASA# 20 - Surgical Safety Checklist: WHO surgical safety checklist completed prior to induction  PQRS# 430 - Adult PONV Prevention: NA - Not adult patient, not GA or 3 or more risk factors NOT present  ASA# 8 - Peds PONV Prevention: NA - Not pediatric patient, not GA or 2 or more risk factors NOT present  PQRS# 424 - Jessie-op Temp Management: NA - MAC anesthesia or case < 60 minutes  PQRS# 426 - PACU Transfer Protocol: - Transfer of care checklist used  ASA# 14 - Acute Post-op Pain: ASA14B - Patient did NOT experience pain >= 7 out of 10

## 2021-06-12 NOTE — ANESTHESIA PREPROCEDURE EVALUATION
Anesthesia Evaluation      Patient summary reviewed     Airway    Pulmonary    (+) asthma  sleep apnea,                          Cardiovascular   Exercise tolerance: > or = 4 METS  (+) hypertension, ,     ECG reviewed        Neuro/Psych    (+) neuromuscular disease,  depression,     Endo/Other    (+) obesity,      GI/Hepatic/Renal    (+)   chronic renal disease,      Other findings:                Obstructive Sleep Apnea     Chronic Fatigue Syndrome    Fracture Of The Upper Limb    Posttraumatic stress disorder    Multiple Environmental Allergies    Chronic Pulpitis    Essential Hypercholesterolemia    Chronic Renal Failure    Fibromyalgia    Edema    Mild Intermittent Asthma    Benign Essential Hypertension    Depression    Lump Or Mass In Breast    Schizoaffective disorder, bipolar type          Dental                         Anesthesia Plan  Planned anesthetic: general mask    ASA 4   Induction: intravenous   Anesthetic plan and risks discussed with: patient    Post-op plan: routine recovery

## 2021-06-13 NOTE — ANESTHESIA POSTPROCEDURE EVALUATION
Patient: Yodit Lai  * No procedures listed *  Anesthesia type: general    Patient location: PACU  Last vitals:   Vitals:    10/30/17 0905   BP: 142/80   Pulse: 82   Resp: 16   Temp:    SpO2: 94%     Post vital signs: stable  Level of consciousness: awake and responds to simple questions  Post-anesthesia pain: pain controlled  Post-anesthesia nausea and vomiting: no  Pulmonary: unassisted, return to baseline  Cardiovascular: stable and blood pressure at baseline  Hydration: adequate  Anesthetic events: no    QCDR Measures:  ASA# 11 - Jessie-op Cardiac Arrest: ASA11B - Patient did NOT experience unanticipated cardiac arrest  ASA# 12 - Jessie-op Mortality Rate: ASA12B - Patient did NOT die  ASA# 13 - PACU Re-Intubation Rate: NA - No ETT / LMA used for case  ASA# 10 - Composite Anes Safety: ASA10A - No serious adverse event    Additional Notes:

## 2021-06-13 NOTE — ANESTHESIA POSTPROCEDURE EVALUATION
Patient: Yodit Lai  * No procedures listed *  Anesthesia type: general    Patient location: PACU  Last vitals:   Vitals:    10/20/17 0905   BP: 126/75   Pulse: 75   Resp: 16   Temp:    SpO2: 94%     Post vital signs: stable  Level of consciousness: awake and responds to simple questions  Post-anesthesia pain: pain controlled  Post-anesthesia nausea and vomiting: no  Pulmonary: unassisted  Cardiovascular: stable  Hydration: adequate  Anesthetic events: no    QCDR Measures:  ASA# 11 - Jessie-op Cardiac Arrest: ASA11B - Patient did NOT experience unanticipated cardiac arrest  ASA# 12 - Jessie-op Mortality Rate: ASA12B - Patient did NOT die  ASA# 13 - PACU Re-Intubation Rate: NA - No ETT / LMA used for case  ASA# 10 - Composite Anes Safety: ASA10A - No serious adverse event    Additional Notes:

## 2021-06-13 NOTE — ANESTHESIA CARE TRANSFER NOTE
Last vitals:   Vitals:    10/06/17 0947   BP: (!) 169/108   Pulse: 88   Resp: 16   Temp: 37.2  C (98.9  F)   SpO2: 98%     Patient's level of consciousness is drowsy  Spontaneous respirations: yes  Maintains airway independently: yes  Dentition unchanged: yes  Oropharynx: oropharynx clear of all foreign objects    QCDR Measures:  ASA# 20 - Surgical Safety Checklist: WHO surgical safety checklist completed prior to induction  PQRS# 430 - Adult PONV Prevention: 4558F - Pt received => 2 anti-emetic agents (different classes) preop & intraop  ASA# 8 - Peds PONV Prevention: NA - Not pediatric patient, not GA or 2 or more risk factors NOT present  PQRS# 424 - Jessie-op Temp Management: 4559F - At least one body temp DOCUMENTED => 35.5C or 95.9F within required timeframe  PQRS# 426 - PACU Transfer Protocol: - Transfer of care checklist used  ASA# 14 - Acute Post-op Pain: ASA14B - Patient did NOT experience pain >= 7 out of 10

## 2021-06-13 NOTE — ANESTHESIA POSTPROCEDURE EVALUATION
Patient: Yodit Lai  * No procedures listed *  Anesthesia type: general    Patient location: PACU  Last vitals:   Vitals:    10/13/17 0953   BP: 135/82   Pulse: 83   Resp: 16   Temp:    SpO2: 93%     Post vital signs: stable  Level of consciousness: awake and responds to simple questions  Post-anesthesia pain: pain controlled  Post-anesthesia nausea and vomiting: no  Pulmonary: unassisted, return to baseline  Cardiovascular: stable and blood pressure at baseline  Hydration: adequate  Anesthetic events: no    QCDR Measures:  ASA# 11 - Jessie-op Cardiac Arrest: ASA11B - Patient did NOT experience unanticipated cardiac arrest  ASA# 12 - Jessie-op Mortality Rate: ASA12B - Patient did NOT die  ASA# 13 - PACU Re-Intubation Rate: NA - No ETT / LMA used for case  ASA# 10 - Composite Anes Safety: ASA10A - No serious adverse event    Additional Notes:

## 2021-06-13 NOTE — ANESTHESIA CARE TRANSFER NOTE
Last vitals:   Vitals:    10/13/17 0937   BP: (!) 163/93   Pulse: 80   Resp: 12   Temp: 36.7  C (98.1  F)   SpO2: 100%     Patient's level of consciousness is drowsy  Spontaneous respirations: yes  Maintains airway independently: yes, with oral airway in place  Dentition unchanged: yes  Oropharynx: oropharynx clear of all foreign objects, oral airway in place    QCDR Measures:  ASA# 20 - Surgical Safety Checklist: WHO surgical safety checklist completed prior to induction  PQRS# 430 - Adult PONV Prevention: NA - Not adult patient, not GA or 3 or more risk factors NOT present  ASA# 8 - Peds PONV Prevention: NA - Not pediatric patient, not GA or 2 or more risk factors NOT present  PQRS# 424 - Jessie-op Temp Management: 4559F - At least one body temp DOCUMENTED => 35.5C or 95.9F within required timeframe  PQRS# 426 - PACU Transfer Protocol: - Transfer of care checklist used  ASA# 14 - Acute Post-op Pain: ASA14B - Patient did NOT experience pain >= 7 out of 10

## 2021-06-13 NOTE — ANESTHESIA CARE TRANSFER NOTE
Last vitals:   Vitals:    09/22/17 1022   BP:    Pulse:    Resp:    Temp: 37.1  C (98.8  F)   SpO2:      Patient's level of consciousness is drowsy  Spontaneous respirations: yes  Maintains airway independently: yes  Dentition unchanged: yes  Oropharynx: oropharynx clear of all foreign objects    QCDR Measures:  ASA# 20 - Surgical Safety Checklist: WHO surgical safety checklist completed prior to induction  PQRS# 430 - Adult PONV Prevention: 4558F-8P - Pt did NOT receive => 2 anti-emetic agents  ASA# 8 - Peds PONV Prevention: NA - Not pediatric patient, not GA or 2 or more risk factors NOT present  PQRS# 424 - Jessie-op Temp Management: NA - MAC anesthesia or case < 60 minutes  PQRS# 426 - PACU Transfer Protocol: - Transfer of care checklist used  ASA# 14 - Acute Post-op Pain: ASA14B - Patient did NOT experience pain >= 7 out of 10

## 2021-06-13 NOTE — ANESTHESIA POSTPROCEDURE EVALUATION
Patient: Yodit Lai  * No procedures listed *  Anesthesia type: No value filed.    Patient location: PACU  Last vitals:   Vitals:    09/29/17 0918   BP: 135/73   Pulse: 89   Resp: 22   Temp:    SpO2: 94%     Post vital signs: stable  Level of consciousness: awake and responds to simple questions  Post-anesthesia pain: pain controlled  Post-anesthesia nausea and vomiting: no  Pulmonary: unassisted, return to baseline  Cardiovascular: stable and blood pressure at baseline  Hydration: adequate  Anesthetic events: no    QCDR Measures:  ASA# 11 - Jessie-op Cardiac Arrest: ASA11B - Patient did NOT experience unanticipated cardiac arrest  ASA# 12 - Jessie-op Mortality Rate: ASA12B - Patient did NOT die  ASA# 13 - PACU Re-Intubation Rate: NA - No ETT / LMA used for case  ASA# 10 - Composite Anes Safety: ASA10A - No serious adverse event    Additional Notes:

## 2021-06-13 NOTE — TELEPHONE ENCOUNTER
Patient calling to confirm for her appointment for today. RN reviewed appointment with patient. Verbalized understanding.     Charles Villalba RN  [facility]

## 2021-06-13 NOTE — ANESTHESIA CARE TRANSFER NOTE
Last vitals:   Vitals:    10/30/17 0843   BP:    Pulse: 81   Resp: 16   Temp: 36.7  C (98  F)   SpO2: 100%     Patient's level of consciousness is awake and drowsy  Spontaneous respirations: yes  Maintains airway independently: yes  Dentition unchanged: yes  Oropharynx: oropharynx clear of all foreign objects    QCDR Measures:  ASA# 20 - Surgical Safety Checklist: WHO surgical safety checklist completed prior to induction  PQRS# 430 - Adult PONV Prevention: NA - Not adult patient, not GA or 3 or more risk factors NOT present  ASA# 8 - Peds PONV Prevention: NA - Not pediatric patient, not GA or 2 or more risk factors NOT present  PQRS# 424 - Jessie-op Temp Management: 4559F - At least one body temp DOCUMENTED => 35.5C or 95.9F within required timeframe  PQRS# 426 - PACU Transfer Protocol: - Transfer of care checklist used  ASA# 14 - Acute Post-op Pain: ASA14B - Patient did NOT experience pain >= 7 out of 10

## 2021-06-13 NOTE — ANESTHESIA POSTPROCEDURE EVALUATION
Patient: Yodit Lai  * No procedures listed *  Anesthesia type: general    Patient location: PACU  Last vitals:   Vitals:    09/22/17 1022   BP:    Pulse:    Resp:    Temp: 37.1  C (98.8  F)   SpO2:      Post vital signs: stable  Level of consciousness: awake and responds to simple questions  Post-anesthesia pain: pain controlled  Post-anesthesia nausea and vomiting: no  Pulmonary: unassisted, return to baseline  Cardiovascular: stable and blood pressure at baseline  Hydration: adequate  Anesthetic events: no    QCDR Measures:  ASA# 11 - Jessie-op Cardiac Arrest: ASA11B - Patient did NOT experience unanticipated cardiac arrest  ASA# 12 - Jessie-op Mortality Rate: ASA12B - Patient did NOT die  ASA# 13 - PACU Re-Intubation Rate: NA - No ETT / LMA used for case  ASA# 10 - Composite Anes Safety: ASA10A - No serious adverse event       Additional Notes:

## 2021-06-13 NOTE — ANESTHESIA CARE TRANSFER NOTE
Last vitals:   Vitals:    10/20/17 0827   BP: 155/87   Pulse: 81   Resp: 16   Temp: 36.9  C (98.5  F)   SpO2: 95%     Patient's level of consciousness is drowsy  Spontaneous respirations: yes  Maintains airway independently: yes  Dentition unchanged: yes  Oropharynx: oropharynx clear of all foreign objects    QCDR Measures:  ASA# 20 - Surgical Safety Checklist: WHO surgical safety checklist completed prior to induction  PQRS# 430 - Adult PONV Prevention: 4558F-8P - Pt did NOT receive => 2 anti-emetic agents  ASA# 8 - Peds PONV Prevention: NA - Not pediatric patient, not GA or 2 or more risk factors NOT present  PQRS# 424 - Jessie-op Temp Management: 4559F - At least one body temp DOCUMENTED => 35.5C or 95.9F within required timeframe  PQRS# 426 - PACU Transfer Protocol: - Transfer of care checklist used  ASA# 14 - Acute Post-op Pain: ASA14B - Patient did NOT experience pain >= 7 out of 10

## 2021-06-13 NOTE — ANESTHESIA PREPROCEDURE EVALUATION
Anesthesia Evaluation      Patient summary reviewed     Airway   Mallampati: I  Neck ROM: full   Pulmonary - negative ROS and normal exam   (+) asthma  sleep apnea,                          Cardiovascular - normal exam  Exercise tolerance: > or = 4 METS  (+) hypertension, ,     (-) murmur  Rhythm: regular  Rate: normal,    no murmur      Neuro/Psych    (+) neuromuscular disease,  depression,     Endo/Other    (+) obesity,      GI/Hepatic/Renal    (+)   chronic renal disease,           Dental    (+) poor dentition                         Anesthesia Plan  Planned anesthetic: general mask    ASA 3   Induction: intravenous   Anesthetic plan and risks discussed with: patient    Post-op plan: routine recovery

## 2021-06-13 NOTE — ANESTHESIA PREPROCEDURE EVALUATION
Anesthesia Evaluation      Patient summary reviewed     Airway   Mallampati: I  Neck ROM: full   Pulmonary - negative ROS and normal exam   (+) asthma  mild,  well controlled, sleep apnea,                          Cardiovascular - normal exam  (+) hypertension well controlled, ,     (-) murmur  Rhythm: regular  Rate: normal,    no murmur      Neuro/Psych    (+) neuromuscular disease,      Endo/Other    (+) obesity,      GI/Hepatic/Renal    (+)   chronic renal disease CRI,           Dental    (+) poor dentition                       Anesthesia Plan  Planned anesthetic: general mask    ASA 3   Induction: intravenous   Anesthetic plan and risks discussed with: patient    Post-op plan: routine recovery
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
## 2021-06-13 NOTE — ANESTHESIA CARE TRANSFER NOTE
Last vitals:   Vitals:    09/15/17 0840   BP: (!) 165/101   Pulse: 82   Resp: 16   Temp: 36.7  C (98.1  F)   SpO2: 98%     Patient's level of consciousness is drowsy  Spontaneous respirations: yes  Maintains airway independently: yes  Dentition unchanged: yes  Oropharynx: oropharynx clear of all foreign objects    QCDR Measures:  ASA# 20 - Surgical Safety Checklist: WHO surgical safety checklist completed prior to induction  PQRS# 430 - Adult PONV Prevention: NA - Not adult patient, not GA or 3 or more risk factors NOT present  ASA# 8 - Peds PONV Prevention: NA - Not pediatric patient, not GA or 2 or more risk factors NOT present  PQRS# 424 - Jessie-op Temp Management: 4559F - At least one body temp DOCUMENTED => 35.5C or 95.9F within required timeframe  PQRS# 426 - PACU Transfer Protocol: - Transfer of care checklist used  ASA# 14 - Acute Post-op Pain: ASA14B - Patient did NOT experience pain >= 7 out of 10

## 2021-06-13 NOTE — TELEPHONE ENCOUNTER
Who is calling:  Patient called and she has an appointment with Dr. Ortega. I let her know that the rules at this point and time is only one person such as her are allowed to go in for the appointment at this time. She wants to go in and then send in a friend of hers or a home worker (ILS worker) to go in after her if cannot go in together. She said that she had the ILS worker on the phone with her last week for an appointment. Can she get the okay to have her person come into the clinic office with her? She needs someone to help her.   Reason for Call:  See above   Date of last appointment with primary care: N/A  Okay to leave a detailed message:Yes, She wants you to call her between 8am and 11am Friday, 12/18 at [phone] or between 11am-3pm at [phone]  Or between 3 onward at [phone]
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
## 2021-06-13 NOTE — ANESTHESIA PREPROCEDURE EVALUATION
Anesthesia Evaluation      Patient summary reviewed     Airway   Mallampati: I  Neck ROM: full   Pulmonary - negative ROS and normal exam   (+) asthma  sleep apnea,                          Cardiovascular - normal exam  (+) hypertension, ,     (-) murmur  Rhythm: regular  Rate: normal,    no murmur      Neuro/Psych    (+) neuromuscular disease,      Endo/Other    (+) obesity,      GI/Hepatic/Renal    (+)   chronic renal disease,           Dental    (+) poor dentition                         Anesthesia Plan  Planned anesthetic: general mask    ASA 3   Induction: intravenous   Anesthetic plan and risks discussed with: patient    Post-op plan: routine recovery

## 2021-06-13 NOTE — TELEPHONE ENCOUNTER
"I agree with ILS worker accompanying her, but not sure if my agreement is \"enough\".  I called the number listed for after 3 pm and the mailbox is full and no-one answered.     I'm not sure what official policy is or what else might be needed  - Melani please comment      Team Please try an call her Monday   "

## 2021-06-13 NOTE — ANESTHESIA CARE TRANSFER NOTE
Last vitals:   Vitals:    09/29/17 0918   BP: 135/73   Pulse: 89   Resp: 22   Temp:    SpO2: 94%     Patient's level of consciousness is drowsy  Spontaneous respirations: yes  Maintains airway independently: yes  Dentition unchanged: yes  Oropharynx: oropharynx clear of all foreign objects    QCDR Measures:  ASA# 20 - Surgical Safety Checklist: WHO surgical safety checklist completed prior to induction  PQRS# 430 - Adult PONV Prevention: 4558F - Pt received => 2 anti-emetic agents (different classes) preop & intraop  ASA# 8 - Peds PONV Prevention: NA - Not pediatric patient, not GA or 2 or more risk factors NOT present  PQRS# 424 - Jessie-op Temp Management: 4559F - At least one body temp DOCUMENTED => 35.5C or 95.9F within required timeframe  PQRS# 426 - PACU Transfer Protocol: - Transfer of care checklist used  ASA# 14 - Acute Post-op Pain: ASA14B - Patient did NOT experience pain >= 7 out of 10

## 2021-06-13 NOTE — ANESTHESIA POSTPROCEDURE EVALUATION
Patient: Yodit Lai  * No procedures listed *  Anesthesia type: general    Patient location: PACU  Last vitals:   Vitals:    10/06/17 1018   BP: 132/68   Pulse: 88   Resp: 18   Temp:    SpO2: 94%     Post vital signs: stable  Level of consciousness: awake and responds to simple questions  Post-anesthesia pain: pain controlled  Post-anesthesia nausea and vomiting: no  Pulmonary: unassisted, return to baseline  Cardiovascular: stable and blood pressure at baseline  Hydration: adequate  Anesthetic events: no    QCDR Measures:  ASA# 11 - Jessie-op Cardiac Arrest: ASA11B - Patient did NOT experience unanticipated cardiac arrest  ASA# 12 - Jessie-op Mortality Rate: ASA12B - Patient did NOT die  ASA# 13 - PACU Re-Intubation Rate: NA - No ETT / LMA used for case  ASA# 10 - Composite Anes Safety: ASA10A - No serious adverse event    Additional Notes:

## 2021-06-13 NOTE — ANESTHESIA POSTPROCEDURE EVALUATION
Patient: Yodit Lai  * No procedures listed *  Anesthesia type: general    Patient location: PACU  Last vitals:   Vitals:    09/15/17 0842   BP: 137/82   Pulse: 80   Resp: 20   Temp:    SpO2: 98%     Post vital signs: stable  Level of consciousness: awake and responds to simple questions  Post-anesthesia pain: pain controlled  Post-anesthesia nausea and vomiting: no  Pulmonary: unassisted, return to baseline  Cardiovascular: stable and blood pressure at baseline  Hydration: adequate  Anesthetic events: no    QCDR Measures:  ASA# 11 - Jessie-op Cardiac Arrest: ASA11B - Patient did NOT experience unanticipated cardiac arrest  ASA# 12 - Jessie-op Mortality Rate: ASA12B - Patient did NOT die  ASA# 13 - PACU Re-Intubation Rate: NA - No ETT / LMA used for case  ASA# 10 - Composite Anes Safety: ASA10A - No serious adverse event    Additional Notes:

## 2021-06-13 NOTE — ANESTHESIA PREPROCEDURE EVALUATION
Anesthesia Evaluation      Patient summary reviewed   No history of anesthetic complications     Airway   Mallampati: III  Neck ROM: full   Pulmonary - negative ROS and normal exam    breath sounds clear to auscultation  (+) asthma  sleep apnea,   (-) recent URI                         Cardiovascular - normal exam  Exercise tolerance: > or = 4 METS  (+) hypertension, ,     Rhythm: regular  Rate: normal,         Neuro/Psych - negative ROS   (+) neuromuscular disease,      Endo/Other    (+) obesity,      GI/Hepatic/Renal - negative ROS   (+)   chronic renal disease,           Dental - normal exam   (+) poor dentition                         Anesthesia Plan  Planned anesthetic: total IV anesthesia and general mask    ASA 3   Induction: intravenous   Anesthetic plan and risks discussed with: patient    Post-op plan: routine recovery

## 2021-06-14 NOTE — TELEPHONE ENCOUNTER
Second attempt:      Voicemail is full - unable to leave a message.  Will try again on Monday.  If pt calls back, please inform her that Dr. Ortega would like pt to schedule a f/u telephone visit with her to touch base on some additional issues that she forgot to discuss at her physical exam.  Please assist in scheduling pt.          CELINE/FELICITY

## 2021-06-14 NOTE — ANESTHESIA CARE TRANSFER NOTE
Last vitals:   Vitals:    11/10/17 0700   BP: 134/64   Pulse: 90   Resp: 20   Temp: 36.5  C (97.7  F)   SpO2: 97%     Patient's level of consciousness is drowsy  Spontaneous respirations: yes  Maintains airway independently: yes  Dentition unchanged: yes  Oropharynx: oropharynx clear of all foreign objects    QCDR Measures:  ASA# 20 - Surgical Safety Checklist: WHO surgical safety checklist completed prior to induction  PQRS# 430 - Adult PONV Prevention: 4558F - Pt received => 2 anti-emetic agents (different classes) preop & intraop  ASA# 8 - Peds PONV Prevention: NA - Not pediatric patient, not GA or 2 or more risk factors NOT present  PQRS# 424 - Jessie-op Temp Management: 4559F - At least one body temp DOCUMENTED => 35.5C or 95.9F within required timeframe  PQRS# 426 - PACU Transfer Protocol: - Transfer of care checklist used  ASA# 14 - Acute Post-op Pain: ASA14B - Patient did NOT experience pain >= 7 out of 10

## 2021-06-14 NOTE — ANESTHESIA CARE TRANSFER NOTE
Last vitals:   Vitals:    12/08/17 0851   BP: 129/85   Pulse: 83   Resp: 12   Temp: 37.1  C (98.7  F)   SpO2: 96%     Patient's level of consciousness is drowsy  Spontaneous respirations: yes  Maintains airway independently: yes  Dentition unchanged: yes  Oropharynx: oropharynx clear of all foreign objects    QCDR Measures:  ASA# 20 - Surgical Safety Checklist: WHO surgical safety checklist completed prior to induction  PQRS# 430 - Adult PONV Prevention: 4558F - Pt received => 2 anti-emetic agents (different classes) preop & intraop  ASA# 8 - Peds PONV Prevention: NA - Not pediatric patient, not GA or 2 or more risk factors NOT present  PQRS# 424 - Jessie-op Temp Management: 4559F - At least one body temp DOCUMENTED => 35.5C or 95.9F within required timeframe  PQRS# 426 - PACU Transfer Protocol: - Transfer of care checklist used  ASA# 14 - Acute Post-op Pain: ASA14B - Patient did NOT experience pain >= 7 out of 10

## 2021-06-14 NOTE — ANESTHESIA POSTPROCEDURE EVALUATION
Patient: Yodit Lai  ECT  Anesthesia type: general    Patient location: PACU  Last vitals:   Vitals:    11/17/17 0945   BP:    Pulse:    Resp: 20   Temp:    SpO2:      Post vital signs: stable  Level of consciousness: awake and responds to simple questions  Post-anesthesia pain: pain controlled  Post-anesthesia nausea and vomiting: no  Pulmonary: unassisted, return to baseline  Cardiovascular: stable and blood pressure at baseline  Hydration: adequate  Anesthetic events: no    QCDR Measures:  ASA# 11 - Jessie-op Cardiac Arrest: ASA11B - Patient did NOT experience unanticipated cardiac arrest  ASA# 12 - Jessie-op Mortality Rate: ASA12B - Patient did NOT die  ASA# 13 - PACU Re-Intubation Rate: NA - No ETT / LMA used for case  ASA# 10 - Composite Anes Safety: ASA10A - No serious adverse event    Additional Notes: Patient is back to her baseline

## 2021-06-14 NOTE — TELEPHONE ENCOUNTER
Voicemail is full - unable to leave a message.  Will try again on Monday.  If pt calls back, please inform her that Dr. Ortega would like pt to schedule a f/u telephone visit with her to touch base on some additional issues that she forgot to discuss at her physical exam.  Please assist in scheduling pt.

## 2021-06-14 NOTE — ANESTHESIA PREPROCEDURE EVALUATION
Anesthesia Evaluation      Patient summary reviewed     Airway   Mallampati: II   Pulmonary - negative ROS and normal exam   (+) asthma  sleep apnea,                          Cardiovascular   (+) hypertension, ,     Rhythm: regular  Rate: normal,         Neuro/Psych    (+) neuromuscular disease,      Endo/Other    (+) obesity,      GI/Hepatic/Renal    (+)   chronic renal disease,           Dental                         Anesthesia Plan  Planned anesthetic: general mask and total IV anesthesia    ASA 3

## 2021-06-14 NOTE — ANESTHESIA CARE TRANSFER NOTE
Last vitals:   Vitals:    11/24/17 0823   BP: 146/88   Pulse: 81   Resp: 18   Temp: 37.1  C (98.7  F)   SpO2: 97%     Patient's level of consciousness is drowsy  Spontaneous respirations: yes  Maintains airway independently: yes  Dentition unchanged: yes  Oropharynx: oropharynx clear of all foreign objects    QCDR Measures:  ASA# 20 - Surgical Safety Checklist: WHO surgical safety checklist completed prior to induction  PQRS# 430 - Adult PONV Prevention: 4558F-8P - Pt did NOT receive => 2 anti-emetic agents  ASA# 8 - Peds PONV Prevention: NA - Not pediatric patient, not GA or 2 or more risk factors NOT present  PQRS# 424 - Jessie-op Temp Management: 4559F - At least one body temp DOCUMENTED => 35.5C or 95.9F within required timeframe  PQRS# 426 - PACU Transfer Protocol: - Transfer of care checklist used  ASA# 14 - Acute Post-op Pain: 0/10

## 2021-06-14 NOTE — TELEPHONE ENCOUNTER
Rafy calling. She is concerned for Yodit's mental health. She is requesting to speak with Dr Ortega. Please call Rafy, not sure if this can be handled over the phone or if we need an appt.

## 2021-06-14 NOTE — ANESTHESIA POSTPROCEDURE EVALUATION
Patient: Yodit Lai  * No procedures listed *  Anesthesia type: general    Patient location: PACU  Last vitals:   Vitals:    12/08/17 0900   BP:    Pulse:    Resp: 20   Temp:    SpO2:      Post vital signs: stable  Level of consciousness: awake and responds to simple questions  Post-anesthesia pain: pain controlled  Post-anesthesia nausea and vomiting: no  Pulmonary: unassisted  Cardiovascular: stable  Hydration: adequate  Anesthetic events: no    QCDR Measures:  ASA# 11 - Jessie-op Cardiac Arrest: ASA11B - Patient did NOT experience unanticipated cardiac arrest  ASA# 12 - Jessie-op Mortality Rate: ASA12B - Patient did NOT die  ASA# 13 - PACU Re-Intubation Rate: NA - No ETT / LMA used for case  ASA# 10 - Composite Anes Safety: ASA10A - No serious adverse event    Additional Notes:

## 2021-06-14 NOTE — ANESTHESIA PREPROCEDURE EVALUATION
Anesthesia Evaluation      Patient summary reviewed     Airway   Mallampati: II  Neck ROM: full   Pulmonary - negative ROS and normal exam    breath sounds clear to auscultation  (+) asthma  sleep apnea,                          Cardiovascular   (+) hypertension, ,     (-) murmur  Rhythm: regular  Rate: normal,    no murmur      Neuro/Psych    (+) neuromuscular disease,      Endo/Other    (+) obesity,      GI/Hepatic/Renal    (+)   chronic renal disease,           Dental - normal exam                        Anesthesia Plan  Planned anesthetic: general mask  Brevital & succinylcholine as per previous anesthetic  Consent per court order (see chart)  ASA 3   Induction: intravenous   Anesthetic plan and risks discussed with: patient    Post-op plan: routine recovery

## 2021-06-14 NOTE — ANESTHESIA POSTPROCEDURE EVALUATION
Patient: Yodit Lai  * No procedures listed *  Anesthesia type: general    Patient location: PACU  Last vitals:   Vitals:    12/01/17 0840   BP: 135/60   Pulse:    Resp: 18   Temp:    SpO2: 94%     Post vital signs: stable  Level of consciousness: awake and responds to simple questions  Post-anesthesia pain: pain controlled  Post-anesthesia nausea and vomiting: no  Pulmonary: unassisted, return to baseline  Cardiovascular: stable and blood pressure at baseline  Hydration: adequate  Anesthetic events: no    QCDR Measures:  ASA# 11 - Jessie-op Cardiac Arrest: ASA11B - Patient did NOT experience unanticipated cardiac arrest  ASA# 12 - Jessie-op Mortality Rate: ASA12B - Patient did NOT die  ASA# 13 - PACU Re-Intubation Rate: NA - No ETT / LMA used for case  ASA# 10 - Composite Anes Safety: ASA10A - No serious adverse event    Additional Notes:

## 2021-06-14 NOTE — TELEPHONE ENCOUNTER
01/11/21 Pt was notified to schedule a telephone visit soon to follow up on additional issues that Dr. Ortega forgot to address. Pt has several other appointments coming up she will call back.

## 2021-06-14 NOTE — ANESTHESIA POSTPROCEDURE EVALUATION
Patient: Yodit Lai  * No procedures listed *  Anesthesia type: general    Patient location: PACU  Last vitals:   Vitals:    11/10/17 0945   BP: 98/59   Pulse: 89   Resp: 18   Temp: 36.9  C (98.4  F)   SpO2: 98%     Post vital signs: stable  Level of consciousness: awake and responds to simple questions  Post-anesthesia pain: pain controlled  Post-anesthesia nausea and vomiting: no  Pulmonary: unassisted, return to baseline  Cardiovascular: stable and blood pressure at baseline  Hydration: adequate  Anesthetic events: no    QCDR Measures:  ASA# 11 - Jessie-op Cardiac Arrest: ASA11B - Patient did NOT experience unanticipated cardiac arrest  ASA# 12 - Jessie-op Mortality Rate: ASA12B - Patient did NOT die  ASA# 13 - PACU Re-Intubation Rate: ASA13B - Patient did NOT require a new airway mgmt  ASA# 10 - Composite Anes Safety: ASA10A - No serious adverse event    Additional Notes:

## 2021-06-14 NOTE — ANESTHESIA CARE TRANSFER NOTE
Last vitals:   Vitals:    12/01/17 0827   BP: 163/87   Pulse: 82   Resp: 16   Temp: 37.6  C (99.6  F)   SpO2: 93%     Patient's level of consciousness is awake and drowsy  Spontaneous respirations: yes  Maintains airway independently: yes  Dentition unchanged: yes  Oropharynx: oropharynx clear of all foreign objects    QCDR Measures:  ASA# 20 - Surgical Safety Checklist: WHO surgical safety checklist completed prior to induction  PQRS# 430 - Adult PONV Prevention: 4558F-1P - Medical reason for NOT administering combination therapy  ASA# 8 - Peds PONV Prevention: NA - Not pediatric patient, not GA or 2 or more risk factors NOT present  PQRS# 424 - Jessie-op Temp Management: NA - MAC anesthesia or case < 60 minutes  PQRS# 426 - PACU Transfer Protocol: - Transfer of care checklist used  ASA# 14 - Acute Post-op Pain: ASA14B - Patient did NOT experience pain >= 7 out of 10

## 2021-06-14 NOTE — TELEPHONE ENCOUNTER
Patient Returning Call  Reason for call:  Message below, and patient will make an appointment later.  Information relayed to patient:  yes  Patient has additional questions:  No  If YES, what are your questions/concerns:  n/a  Okay to leave a detailed message?: No call back needed

## 2021-06-14 NOTE — ANESTHESIA PREPROCEDURE EVALUATION
Anesthesia Evaluation      Patient summary reviewed     Airway   Mallampati: II   Pulmonary - negative ROS and normal exam   (+) asthma  sleep apnea,                          Cardiovascular   (+) hypertension, ,     Rhythm: regular  Rate: normal,         Neuro/Psych    (+) neuromuscular disease,      Endo/Other    (+) obesity,      GI/Hepatic/Renal    (+)   chronic renal disease,           Dental                         Anesthesia Plan  Planned anesthetic: general mask and total IV anesthesia    ASA 3     Anesthetic plan and risks discussed with: patient    Post-op plan: routine recovery

## 2021-06-14 NOTE — ANESTHESIA CARE TRANSFER NOTE
Last vitals:   Vitals:    11/17/17 0931   BP: 127/83   Pulse: 86   Resp: 16   Temp:    SpO2: 98%     Patient's level of consciousness is drowsy  Spontaneous respirations: yes  Maintains airway independently: yes  Dentition unchanged: yes  Oropharynx: oropharynx clear of all foreign objects    QCDR Measures:  ASA# 20 - Surgical Safety Checklist: WHO surgical safety checklist completed prior to induction  PQRS# 430 - Adult PONV Prevention: 4558F - Pt received => 2 anti-emetic agents (different classes) preop & intraop  ASA# 8 - Peds PONV Prevention: NA - Not pediatric patient, not GA or 2 or more risk factors NOT present  PQRS# 424 - Jessie-op Temp Management: NA - MAC anesthesia or case < 60 minutes  PQRS# 426 - PACU Transfer Protocol: - Transfer of care checklist used  ASA# 14 - Acute Post-op Pain: ASA14B - Patient did NOT experience pain >= 7 out of 10

## 2021-06-14 NOTE — ANESTHESIA POSTPROCEDURE EVALUATION
Patient: Yodit Lai  ECT  Anesthesia type: general    Patient location: PACU  Last vitals:   Vitals:    12/15/17 0940   BP: 133/73   Pulse: 74   Resp: 16   Temp: 36.9  C (98.4  F)   SpO2: 92%     Post vital signs: stable  Level of consciousness: awake and responds to simple questions  Post-anesthesia pain: pain controlled  Post-anesthesia nausea and vomiting: no  Pulmonary: unassisted, return to baseline  Cardiovascular: stable and blood pressure at baseline  Hydration: adequate  Anesthetic events: no    QCDR Measures:  ASA# 11 - Jessie-op Cardiac Arrest: ASA11B - Patient did NOT experience unanticipated cardiac arrest  ASA# 12 - Jessie-op Mortality Rate: ASA12B - Patient did NOT die  ASA# 13 - PACU Re-Intubation Rate: NA - No ETT / LMA used for case  ASA# 10 - Composite Anes Safety: ASA10A - No serious adverse event    Additional Notes:

## 2021-06-14 NOTE — ANESTHESIA POSTPROCEDURE EVALUATION
Patient: Yodit Lai  * No procedures listed *  Anesthesia type: general    Patient location: PACU  Last vitals:   Vitals:    11/24/17 0830   BP: 140/80   Pulse: 80   Resp: 18   Temp:    SpO2: 92%     Post vital signs: stable  Level of consciousness: awake and responds to simple questions  Post-anesthesia pain: pain controlled  Post-anesthesia nausea and vomiting: no  Pulmonary: unassisted, return to baseline  Cardiovascular: stable and blood pressure at baseline  Hydration: adequate  Anesthetic events: no    QCDR Measures:  ASA# 11 - Jessie-op Cardiac Arrest: ASA11B - Patient did NOT experience unanticipated cardiac arrest  ASA# 12 - Jessie-op Mortality Rate: ASA12B - Patient did NOT die  ASA# 13 - PACU Re-Intubation Rate: ASA13B - Patient did NOT require a new airway mgmt  ASA# 10 - Composite Anes Safety: ASA10A - No serious adverse event    Additional Notes:

## 2021-06-14 NOTE — TELEPHONE ENCOUNTER
"PLEASE do call Yodit to schedule a telephone follow up visit.  You can say there were additional issues form her physical exam that I wanted to touch base with her about. Yodit my \"bring\" her friend Vinita along for this phone visit if she likes  ---    I called and talked to Rafy, who said that Yodit had stopped going to her psychiatrist Dr. Rajan in August 2019 - she did not want to see Dr. Rajan anymore - and shortly after stopped taking Olanzapine.     She fears Yodit may be decompensating    Rafy  specifically DID NOT say anything about having Yodit go to the hospital , be committed, or any of the other things Yodit had told me in her last visit that Yodit feared were going on    Discussed the Yodit had been compliant in caring for medical issues.  She asked if I had concerns about her mental health, and I simply answered yes, without elaborating. - see my exam note    We discussed I will talk again with Yodit, see if she would be willing to see another psychiatrist and/or start olanzapine again.  "

## 2021-06-14 NOTE — ANESTHESIA CARE TRANSFER NOTE
Last vitals:   Vitals:    12/15/17 0932   BP: (!) 153/93   Pulse: 72   Resp: 12   Temp:    SpO2: 100%     Patient's level of consciousness is drowsy  Spontaneous respirations: yes  Maintains airway independently: yes  Dentition unchanged: yes  Oropharynx: oropharynx clear of all foreign objects    QCDR Measures:  ASA# 20 - Surgical Safety Checklist: WHO surgical safety checklist completed prior to induction  PQRS# 430 - Adult PONV Prevention: 4558F - Pt received => 2 anti-emetic agents (different classes) preop & intraop  ASA# 8 - Peds PONV Prevention: NA - Not pediatric patient, not GA or 2 or more risk factors NOT present  PQRS# 424 - Jessie-op Temp Management: 4559F - At least one body temp DOCUMENTED => 35.5C or 95.9F within required timeframe  PQRS# 426 - PACU Transfer Protocol: - Transfer of care checklist used  ASA# 14 - Acute Post-op Pain: ASA14B - Patient did NOT experience pain >= 7 out of 10

## 2021-06-14 NOTE — TELEPHONE ENCOUNTER
Refill Approved    Rx renewed per Medication Renewal Policy. Medication was last renewed on 9/25/20.    Jennifer Almonte, [facility] Triage/Med Refill 12/24/2020     Requested Prescriptions   Pending Prescriptions Disp Refills     lisinopriL (PRINIVIL,ZESTRIL) 20 MG tablet [Pharmacy Med Name: LISINOPRIL 20MG TABLETS] 90 tablet 0     Sig: TAKE 1 TABLET(20 MG) BY MOUTH DAILY       [facility] Inhibitors Refill Protocol Passed - 12/22/2020  3:06 PM        Passed - PCP or prescribing provider visit in past 12 months       Last office visit with prescriber/PCP: 12/10/2019 Vinita Ortega MD OR same dept: Visit date not found OR same specialty: 12/10/2019 Vinita Ortega MD  Last physical: 5/14/2019 Last MTM visit: Visit date not found   Next visit within 3 mo: 12/22/2020 Vinita Ortega MD  Next physical within 3 mo: Visit date not found  Prescriber OR PCP: Vinita Ortega MD  Last diagnosis associated with med order: 1. Benign Essential Hypertension  - lisinopriL (PRINIVIL,ZESTRIL) 20 MG tablet [Pharmacy Med Name: LISINOPRIL 20MG TABLETS]; TAKE 1 TABLET(20 MG) BY MOUTH DAILY  Dispense: 90 tablet; Refill: 0    If protocol passes may refill for 12 months if within 3 months of last provider visit (or a total of 15 months).             Passed - Serum Potassium in past 12 months     Lab Results   Component Value Date    Potassium 4.9 12/22/2020             Passed - Blood pressure filed in past 12 months     BP Readings from Last 1 Encounters:   12/22/20 142/80             Passed - Serum Creatinine in past 12 months     Creatinine   Date Value Ref Range Status   12/22/2020 0.99 0.60 - 1.10 mg/dL Final

## 2021-06-15 NOTE — TELEPHONE ENCOUNTER
Team from her new MDs office did call back to say that they had read my last note and were aware of my concncerns

## 2021-06-15 NOTE — TELEPHONE ENCOUNTER
I was going to call Yodit to check in with her, and see she established care with a new physician.    For the record, this is concerning.  See my visit 12/22/20.  She seems to be getting paranoid thoughts around her guardian.  Her guardian called me separately with concerns.  I had reached out to her and team called her back to request follow up visit with me (see telephone call 1/11/21)    I did call to try and touch base with new PCP, just to share my concerns - directed message to have her look at my last visit - which she should be able to do through [facility]     Also left a message with her guardian about this change

## 2021-06-16 NOTE — PROGRESS NOTES
"FEMALE PREVENTATIVE EXAM    Assessment and Plan:       1. Routine general medical examination at a health care facility    2. Papanicolaou smear for cervical cancer screening  - Gynecologic Cytology (PAP Smear)  - HPV High Risk DNA Cervical    3. Hyperlipidemia  - rosuvastatin (CRESTOR) 10 MG tablet; Take 1 tablet (10 mg total) by mouth at bedtime.  Dispense: 30 tablet; Refill: 11    4. Preventive measure  - Ambulatory referral to Ophthalmology    follow up 1-3 months    Immunization Review  Adult Imm Review: she will look into coverage of Shingrix     I discussed the following with the patient:   Adult Healthy Living: Importance of regular exercise  Healthy nutrition  importance of socialization   If doing breast self exam  - NOT \"looking for lumps\" but getting to know what is your normal and being aware of changes    Patient Instructions   Look for a dentist      Subjective:   Chief Complaint: Yodit Lai is an 62 y.o. female here for a preventative health visit.     HPI:  Well exam, lab review    Yodit is on olanzapine and I had ordered fasting labs when she came to establish care a month ago.  We discussed these today.  She does not eat a lot of fatty foods - I did discuss that elevated lipids could at least in part be to medications, but nonetheless need to be managed with medication    She is psychiatrist, therapist,  Has a cadi waiver and has a  guardian      Recent Results (from the past 240 hour(s))   Lipid Cascade   Result Value Ref Range    Cholesterol 281 (H) <=199 mg/dL    Triglycerides 238 (H) <=149 mg/dL    HDL Cholesterol 35 (L) >=50 mg/dL    LDL Calculated 198 (H) <=129 mg/dL    Patient Fasting > 8hrs? Yes    Comprehensive Metabolic Panel   Result Value Ref Range    Sodium 143 136 - 145 mmol/L    Potassium 4.4 3.5 - 5.0 mmol/L    Chloride 108 (H) 98 - 107 mmol/L    CO2 24 22 - 31 mmol/L    Anion Gap, Calculation 11 5 - 18 mmol/L    Glucose 105 70 - 125 mg/dL    BUN 13 8 - 22 mg/dL    Creatinine " "1.10 0.60 - 1.10 mg/dL    GFR MDRD Af Amer >60 >60 mL/min/1.73m2    GFR MDRD Non Af Amer 51 (L) >60 mL/min/1.73m2    Bilirubin, Total 0.3 0.0 - 1.0 mg/dL    Calcium 9.4 8.5 - 10.5 mg/dL    Protein, Total 6.8 6.0 - 8.0 g/dL    Albumin 3.8 3.5 - 5.0 g/dL    Alkaline Phosphatase 72 45 - 120 U/L    AST 23 0 - 40 U/L    ALT 31 0 - 45 U/L   HM1 (CBC with Diff)   Result Value Ref Range    WBC 6.0 4.0 - 11.0 thou/uL    RBC 5.07 3.80 - 5.40 mill/uL    Hemoglobin 13.7 12.0 - 16.0 g/dL    Hematocrit 44.3 35.0 - 47.0 %    MCV 87 80 - 100 fL    MCH 27.0 27.0 - 34.0 pg    MCHC 30.9 (L) 32.0 - 36.0 g/dL    RDW 13.9 11.0 - 14.5 %    Platelets 288 140 - 440 thou/uL    MPV 11.4 8.5 - 12.5 fL    Neutrophils % 66 50 - 70 %    Lymphocytes % 25 20 - 40 %    Monocytes % 6 2 - 10 %    Eosinophils % 3 0 - 6 %    Basophils % 0 0 - 2 %    Neutrophils Absolute 4.0 2.0 - 7.7 thou/uL    Lymphocytes Absolute 1.5 0.8 - 4.4 thou/uL    Monocytes Absolute 0.3 0.0 - 0.9 thou/uL    Eosinophils Absolute 0.2 0.0 - 0.4 thou/uL    Basophils Absolute 0.0 0.0 - 0.2 thou/uL      Also:  \"Everything swells up around the eye area \" when she wakes up in the am.  She denies itchy eyes or discharge     There are times when skin gets itchy - will try moisturizer    Healthy Habits  Are you taking a daily aspirin? No  Do you typically exercising at least 40 min, 3-4 times per week?  Yes - not on a daily basis. Walks to bus stops.  Can't tolerate the air quality at the gym - no computer or smart phone or DVD player. No friends. \"Family is the reason I have this committment hanging over me. \"She has a guardian and a cadi waiver manager and a  - stays in touch by phone  Do you usually eat at least 4 servings of fruit and vegetables a day, include whole grains and fiber and avoid regularly eating high fat foods? Yes  Have you had an eye exam in the past two years? NO  Do you see a dentist twice per year? NO - \"I'm behind on that - the one I saw before ruined " "teeth and was doing false bill to medicare\"   Do you have any concerns regarding sleep? YES         Wt Readings from Last 3 Encounters:   03/16/18 (!) 243 lb (110.2 kg)   02/16/18 (!) 246 lb (111.6 kg)         Safety Screen  If you own firearms, are they secured in a locked gun cabinet or with trigger locks? NO  Do you feel you are safe where you are living?: Yes (6/1/2017  2:43 PM)  Do you feel you are safe in your relationship(s)?: Yes (6/1/2017  2:43 PM)    Review of Systems:    Constitutional: negative for recent illness or change in weight  Eyes: negative for irritation and vision change; puffy lids in the am  Ears, nose, mouth, throat, and face: negative for nasal congestion and sore throat  Respiratory: negative for cough and dyspnea on exertion  Cardiovascular: negative for chest pain and palpitations  Gastrointestinal: negative for abdominal pain and change in bowel habits  Genitourinary:negative for dysuria, frequency and hesitancy  Integument/breast: negative for rash  Musculoskeletal:negative for arthralgias and myalgias  Neurological: negative for dizziness, headaches and paresthesia      Preventive:   - pap done today   - referrals done for mammogram and eye exam      History     Not marked as reviewed during this visit.      Note: all histories reviewed when she established care a month ago      Objective:   Vital Signs:   Visit Vitals     /86     Pulse 87     Temp 98  F (36.7  C) (Tympanic)     Resp 12     Ht 5' 7\" (1.702 m)     Wt (!) 243 lb (110.2 kg)     SpO2 97%     BMI 38.06 kg/m2          PHYSICAL EXAM  General appearance - alert, well appearing, and in no distress  Mental status - normal behavior, speech, casual dress no bra; thought processes - continues with belief that sister lied many years about her, flat mood and affect  Eyes - funduscopic exam normal, discs flat and sharp  Ears - bilateral TM's and external ear canals normal  Mouth - mucous membranes moist, pharynx normal without " lesions  Neck - supple, no significant adenopathy, carotids upstroke normal bilaterally, no bruits, thyroid exam: thyroid is normal in size without nodules or tenderness  Chest - clear to auscultation, no wheezes, rales or rhonchi, symmetric air entry  Heart - normal rate, regular rhythm, normal S1, S2, no murmurs, rubs, clicks or gallops  Abdomen - soft, nontender, nondistended, no masses or organomegaly  protruberant  Breasts - breasts appear normal, no suspicious masses, no skin or nipple changes or axillary nodes, pendulous  Pelvic - normal external genitalia, vulva, vagina, cervix, uterus and adnexa  Neurological - DTR's normal and symmetric  Extremities - peripheral pulses normal, no pedal edema, no clubbing or cyanosis  Skin - no rashes or worrisome lesinos      The 10-year ASCVD risk score (Exetermihir HERRERA Jr, et al., 2013) is: 6.8%    Values used to calculate the score:      Age: 62 years      Sex: Female      Is Non- : No      Diabetic: No      Tobacco smoker: No      Systolic Blood Pressure: 120 mmHg      Is BP treated: Yes      HDL Cholesterol: 35 mg/dL      Total Cholesterol: 281 mg/dL
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
## 2021-06-17 NOTE — TELEPHONE ENCOUNTER
Patient was seen by Dr. Ortega on 12/22/20.  Patient was to return to lipid check but has not done so.

## 2021-06-17 NOTE — TELEPHONE ENCOUNTER
Former patient of Sarmad & has not established care with another provider.  Please assign refill request to covering provider per Clinic standard process.      RN cannot approve Refill Request    RN can NOT refill this medication no pcp. Last office visit: 12/22/2020 Vinita Ortega MD Last Physical: 5/14/2019 Last MTM visit: Visit date not found Last visit same specialty: Visit date not found.  Next visit within 3 mo: Visit date not found  Next physical within 3 mo: Visit date not found      Torey Whiting, [facility] Connection Triage/Med Refill 5/18/2021    Requested Prescriptions   Pending Prescriptions Disp Refills     rosuvastatin (CRESTOR) 10 MG tablet [Pharmacy Med Name: ROSUVASTATIN 10MG TABLETS] 90 tablet 2     Sig: TAKE 1 TABLET(10 MG) BY MOUTH AT BEDTIME       Statins Refill Protocol (Hmg CoA Reductase Inhibitors) Passed - 5/17/2021  3:18 AM        Passed - PCP or prescribing provider visit in past 12 months      Last office visit with prescriber/PCP: 12/22/2020 Vinita Ortega MD OR same dept: Visit date not found OR same specialty: Visit date not found  Last physical: 5/14/2019 Last MTM visit: Visit date not found   Next visit within 3 mo: Visit date not found  Next physical within 3 mo: Visit date not found  Prescriber OR PCP: Vinita Ortega MD  Last diagnosis associated with med order: 1. Hyperlipidemia  - rosuvastatin (CRESTOR) 10 MG tablet [Pharmacy Med Name: ROSUVASTATIN 10MG TABLETS]; TAKE 1 TABLET(10 MG) BY MOUTH AT BEDTIME  Dispense: 90 tablet; Refill: 2    If protocol passes may refill for 12 months if within 3 months of last provider visit (or a total of 15 months).

## 2021-06-18 NOTE — PATIENT INSTRUCTIONS - HE
"Patient Instructions by Vinita Ortega MD at 12/22/2020 12:20 PM     Author: Vinita Ortega MD Service: -- Author Type: Physician    Filed: 12/22/2020 10:19 PM Encounter Date: 12/22/2020 Status: Addendum    : Vinita Ortega MD (Physician)    Related Notes: Original Note by Vinita Ortega MD (Physician) filed at 12/22/2020  1:02 PM       Weight loss Basics :   - watch portions - choose a set portion size with a measuring cup or \"Tupperware' container  - eat 3 meals daily  - eat protein first - this includes nuts, beans, tofu, fish and meat  - eat vegetables and or fruit with every meal and choose a variety of colors  - limit high glycemic foods  -eat slowly, chewing food well  -avoid/limit calorie containing beverages and beverages with artificial sweeteners  -choose whole grains, not white with breads, crackers, pastas, bailey, bagels, tortillas, rice  -limit restaurant or cafeteria eating to twice a week or less  -Restorative sleep and stress management is important for maintaining a healthy weight.  - Find a way to exercise that you like:    - Check out www.Tintri for exercise videos - they have some videos that are just 10  minutes     - If you like dancing, there are a TON of logan videos online     - Check out your local recreation center for access to exercise equipment, walking tracks and classes - these are often less expensive than joining a gym     - Check out the web sites for Cra[employer], [employer]er[employer] fl[employer]ur,[place] [employer], or your local neighborhood facebook group for free exercise equipment       Here is a useful web site that includes a link to the honoring choices form (under how do I document my choices?):     https://www.[facility].org/our-community-commitment/honoring-choices    The three must haves for this form are  1) who speaks for you (health care agent if you cannot speak for yourself, and what 'espinal' they have  2) what interventions you want if yo are permanently " "unconscious  3) making this legal - either by notary, or by two signatures of witnesses, neither of who is your health care agent    There are other parts to the form that are optional    No rush for this! Just something we are supposed to discuss every so often.    If you complete the form, and can make a pdf of it, you can send me a message with that pdf attached and it will become part of your chart here; if you cannot make an electronic copy to send by T[employer], mailing the document is also fine.  You might also consider putting it on your fridge under \"Emergency Instructions.,\" as well as having a copy for yourself and your family.    Please let me know if you have any questions.    Vinita Ortega MD    Patient Education   Understanding Skift MyPlate  The USDA (US Department of Agriculture) has guidelines to help you make healthy food choices. These are called MyPlate. MyPlate shows the food groups that make up healthy meals using the image of a place setting. Before you eat, think about the healthiest choices for what to put onto your plate or into your cup or bowl. To learn more about building a healthy plate, visit www.choosemyplate.gov.       The Food Groups    Fruits: Any fruit or 100% fruit juice counts as part of the Fruit Group. Fruits may be fresh, canned, frozen, or dried, and may be whole, cut-up, or pureed. Make half your plate fruits and vegetables.    Vegetables: Any vegetable or 100% vegetable juice counts as a member of the Vegetable Group. Vegetables may be fresh, frozen, canned, or dried. They can be served raw or cooked and may be whole, cut-up, or mashed. Make half your plate fruits and vegetables.     Grains: All foods made from grains are part of the Grains Group. These include wheat, rice, oats, cornmeal, and barley such as bread, pasta, oatmeal, cereal, tortillas, and grits. Grains should be no more than a quarter of your plate. At least half of your grains should be whole " grains.    Protein: This group includes meat, poultry, seafood, beans and peas, eggs, processed soy products (like tofu), nuts (including nut butters), and seeds. Make protein choices no more than a quarter of your plate. Meat and poultry choices should be lean or low fat.    Dairy: All fluid milk products and foods made from milk that contain calcium, like yogurt and cheese are part of the Dairy Group. (Foods that have little calcium, such as cream, butter, and cream cheese, are not part of the group.) Most dairy choices should be low-fat or fat-free.    Oils: These are fats that are liquid at room temperature. They include canola, corn, olive, soybean, and sunflower oil. Foods that are mainly oil include mayonnaise, certain salad dressings, and soft margarines. You should have only 5 to 7 teaspoons of oils a day. You probably already get this much from the food you eat.  Use Studer Grouper to Help Build Your Meals  The SuperTracker can help you plan and track your meals and activity. You can look up individual foods to see or compare their nutritional value. You can get guidelines for what and how much you should eat. You can compare your food choices. And you can assess personal physical activities and see ways you can improve. Go to www.[employer].gov/supertracker/.    0379-4733 The [employer]. [address] [place] [address]. All rights reserved. This information is not intended as a substitute for professional medical care. Always follow your healthcare professional's instructions.           Patient Education   Instrumental Activities of Daily Living  Your Health Risk Assessment indicates you have difficulties with instrumental activities of daily living which include laundry, housekeeping, banking, shopping, using the telephone, food preparation, transportation, or taking your own medications. Please make a follow up appointment for us to address this issue in more detail.    [facility]  has resources available on the following website: https://www.[facility].org/caregivers.html     Also, here is a local agency that provides help with meals and other assistance:   [facility]: [phone]     Patient Education   Understanding Advance Care Planning  Advance care planning is the process of deciding ones own future medical care. It helps ensure that if you cant speak for yourself, your wishes can still be carried out. The plan is a series of legal documents that note a persons wishes. The documents vary by state. Advance care planning may be done when a person has a serious illness that is expected to get worse. It may be done before major surgery. And it can help you and your family be prepared in case of a major illness or injury. Advance care planning helps with making decisions at these times.       A health care proxy is a person who acts as the voice of a patient when the patient cant speak for himself or herself. The name of this role varies by state. It may be called a Durable Medical Power of  or Durable Power of  for Healthcare. It may be called an agent, surrogate, or advocate. Or it may be called a representative or decision maker. It is an official duty that is identified by a legal document. The document also varies by state.    Why Is Advance Care Planning Important?  If a person communicates their healthcare wishes:    They will be given medical care that matches their values and goals.    Their family members will not be forced to make decisions in a crisis with no guidance.  Creating a Plan  Making an advance care plan is often done in 3 steps:    Thinking about ones wishes. To create an advance care plan, you should think about what kind of medical treatment you would want if you lose the ability to communicate. Are there any situations in which you would refuse or stop treatment? Are there therapies you would want or not want? And whom do you want to make  decisions for you? There are many places to learn more about how to plan for your care. Ask your doctor or  for resources.    Picking a health care proxy. This means choosing a trusted person to speak for you only when you cant speak for yourself. When you cannot make medical decisions, your proxy makes sure the instructions in your advance care plan are followed. A proxy does not make decisions based on his or her own opinions. They must put aside those opinions and values if needed, and carry out your wishes.    Filling out the legal documents. There are several kinds of legal documents for advance care planning. Each one tells health care providers your wishes. The documents may vary by state. They must be signed and may need to be witnessed or notarized. You can cancel or change them whenever you wish. Depending on your state, the documents may include a Healthcare Proxy form, Living Will, Durable Medical Power of , Advance Directive, or others.  The Familys Role  The best help a family can give is to support their loved ones wishes. Open and honest communication is vital. Family should express any concerns they have about the patients choices while the patient can still make decisions.    0210-5903 The [employer]. [address] [place] [address]. All rights reserved. This information is not intended as a substitute for professional medical care. Always follow your healthcare professional's instructions.         Also, [employer] [place] offers a free, downloadable health care directive that allows you to share your treatment choices and personal preferences if you cannot communicate your wishes. It also allows you to appoint another person (called a health care agent) to make health care decisions if you are unable to do so. You can download an advance directive by going here: [email or web address]/Buzz All Starsing-farmflo.html     Patient Education   Personalized  Prevention Plan  You are due for the preventive services outlined below.  Your care team is available to assist you in scheduling these services.  If you have already completed any of these items, please share that information with your care team to update in your medical record.  Health Maintenance   Topic Date Due   ? HEPATITIS C SCREENING  1955   ? HIV SCREENING  06/18/1972   ? ZOSTER VACCINES (1 of 2) 06/18/2007   ? MAMMOGRAM  03/23/2020   ? COLORECTAL CANCER SCREENING  02/08/2021   ? INFLUENZA VACCINE RULE BASED (1) 12/22/2020 (Originally 8/1/2020)   ? MEDICARE ANNUAL WELLNESS VISIT  12/22/2021   ? PAP SMEAR  03/16/2023   ? HPV TEST  03/16/2023   ? ADVANCE CARE PLANNING  05/14/2024   ? LIPID  10/21/2024   ? TD 18+ HE  12/22/2030   ? DEPRESSION ACTION PLAN  Completed   ? TDAP ADULT ONE TIME DOSE  Completed   ? Pneumococcal Vaccine: Pediatrics (0 to 5 Years) and At-Risk Patients (6 to 64 Years)  Aged Out   ? HEPATITIS B VACCINES  Aged Out

## 2021-06-18 NOTE — PROGRESS NOTES
Assessment and Plan    1. Benign Essential Hypertension  Even though her blood pressure is better than at last visit, it is still above current guidelines for good control. She had previously taken lisinopril 10 mg plus HCTZ 25 and I took her OFF because her blood pressure had been better controlled. Rather than restarting her on this, I'll start a combine pill. I would like to have her come back next week for a recheck and basic metabolic panel    - lisinopril-hydrochlorothiazide (ZESTORETIC) 10-12.5 mg per tablet; Take 1 tablet by mouth daily.  Dispense: 90 tablet; Refill: 1  - Basic Metabolic Panel; Standing     No Follow-up on file.    Vinita Ortega MD     -------------------------------------------    Chief Complaint   Patient presents with     Concerns     BP-was told to discuss about medication.  BP wasn't that high.      Yodit came for a blood pressure check here on 5/24 and it was elevated - she was directed to follow up with PCP.  She does not usually check her blood pressure, travels only by bus, and does not have a pharmacy or fire station within walking distance of her home. She has  no chest pains, palpitations or dyspnea    BP Readings from Last 3 Encounters:   06/07/18 118/88   05/24/18 (!) 142/102   03/16/18 120/86       Patient Active Problem List   Diagnosis     Obstructive Sleep Apnea     Chronic Fatigue Syndrome     Posttraumatic stress disorder     Multiple Environmental Allergies     Chronic Pulpitis     Essential Hypercholesterolemia     Fibromyalgia     Benign Essential Hypertension     Depression     Lump Or Mass In Breast     Schizoaffective disorder, bipolar type         Current Outpatient Prescriptions:      nystatin (MYCOSTATIN) cream, Use as directed, Disp: 30 g, Rfl: 0     OLANZapine (ZYPREXA) 20 MG tablet, Take 1 tablet (20 mg total) by mouth at bedtime., Disp: 30 tablet, Rfl: 0     rosuvastatin (CRESTOR) 10 MG tablet, Take 1 tablet (10 mg total) by mouth at bedtime., Disp: 30  tablet, Rfl: 11     hydroCHLOROthiazide (HYDRODIURIL) 25 MG tablet, Take 1 tablet (25 mg total) by mouth daily., Disp: 30 tablet, Rfl: 0     lisinopril (PRINIVIL,ZESTRIL) 10 MG tablet, Take 1 tablet (10 mg total) by mouth daily., Disp: 30 tablet, Rfl: 0     lisinopril-hydrochlorothiazide (ZESTORETIC) 10-12.5 mg per tablet, Take 1 tablet by mouth daily., Disp: 90 tablet, Rfl: 1        History   Smoking Status     Never Smoker   Smokeless Tobacco     Never Used       History   Alcohol Use No       Vitals:    06/07/18 1635   BP: 118/88   Pulse: 81   SpO2: 98%     Body mass index is 35.24 kg/(m^2).     EXAM:    General appearance - alert, well appearing, and in no distress  Chest - clear to auscultation, no wheezes, rales or rhonchi, symmetric air entry  Heart - normal rate, regular rhythm, normal S1, S2, no murmurs, rubs, clicks or gallops

## 2021-06-19 NOTE — LETTER
Letter by Vinita Ortega MD at      Author: Vinita Ortega MD Service: -- Author Type: --    Filed:  Encounter Date: 5/17/2019 Status: (Other)         Yodit Lai  [address]  [place] [address]       May 17, 2019     Dear Ms. Lai,    Below are the results from your recent visit: Your cholesterol is well controlled on your current medication. Your test for kidney function and electrolytes is normal    Resulted Orders   Lipid [place] FASTING   Result Value Ref Range    Cholesterol 187 <=199 mg/dL    Triglycerides 138 <=149 mg/dL    HDL Cholesterol 49 (L) >=50 mg/dL    LDL Calculated 110 <=129 mg/dL    Patient Fasting > 8hrs? Yes    Basic Metabolic Panel   Result Value Ref Range    Sodium 140 136 - 145 mmol/L    Potassium 4.0 3.5 - 5.0 mmol/L    Chloride 106 98 - 107 mmol/L    CO2 21 (L) 22 - 31 mmol/L    Anion Gap, Calculation 13 5 - 18 mmol/L    Glucose 80 70 - 125 mg/dL    Calcium 10.4 8.5 - 10.5 mg/dL    BUN 16 8 - 22 mg/dL    Creatinine 1.00 0.60 - 1.10 mg/dL    GFR MDRD Af Amer >60 >60 mL/min/1.73m2    GFR MDRD Non Af Amer 56 (L) >60 mL/min/1.73m2    Narrative    Fasting Glucose reference range is 70-99 mg/dL per  American Diabetes Association (ADA) guidelines.       Please call with questions or contact us using [employer].    Sincerely,         Vinita Ortega MD

## 2021-06-19 NOTE — LETTER
Letter by Vinita Ortega MD at      Author: Vinita Ortega MD Service: -- Author Type: --    Filed:  Encounter Date: 10/31/2019 Status: Signed         Yodit Lai  [address]  [place] [address]             October 31, 2019     Dear Ms. Joelle,    Below are the results from your recent visit: Your blood test for celiac disease is negative.  While a biopsy is considered the gold standard, I do not think that would be worth pursuing at this point.     Resulted Orders   Celiac(Gluten)Antibody Panel   Result Value Ref Range    Gliadin IgA 5.2 0.0-<7.0 U/mL    Gliadin IgG 0.5 0.0-<7.0 U/mL    Tissue Transglutaminase IgG AB <0.6 0.0-<7.0 U/mL    Tissue Transglutaminase IgA AB 0.3 0.0-<7.0 U/mL    Immunoglobulin A 179 65 - 400 mg/dL    Narrative      < 7 U/mL = Negative    7-10 U/mL = Equivocal    > 10 U/mL = Positive    Positive results for the tTG and/or gliadin antibodies indicate possible celiac disease and a small intestinal biopsy my be indicated. Antibody levels decrease in patients on gluten-free diets; therefore, negative results do not exclude celiac disease. Total serum IgA is measured to identify selective IgA deficiency, which is present in up to 10% of celiac disease patients. Such patients would have negative results on IgA assays, but may have positive results on IgG antibody assays.         Please call with questions or contact us using [employer].    Sincerely,        Electronically signed by Vinita Ortega MD

## 2021-06-20 NOTE — LETTER
Letter by Vinita Ortega MD at      Author: Vinita Ortega MD Service: -- Author Type: --    Filed:  Encounter Date: 12/10/2019 Status: Signed                    My Depression Action Plan  Name: Yodit Lai   Date of Birth 1955  Date: 12/10/2019    My Doctor: Vinita rOtega MD   My Clinic: [facility]  [address] [address]  [phone]          GREEN    ZONE   Good Control    What it looks like:     Things are going generally well. You have normal ups and downs. You may even feel depressed from time to time, but bad moods usually last less than a day.   What you need to do:  1. Continue to care for yourself (see self care plan)  2. Check your depression survival kit and update it as needed  3. Follow your physicians recommendations including any medication.  4. Do not stop taking medication unless you consult with your physician first.           YELLOW         ZONE Getting Worse    What it looks like:     Depression is starting to interfere with your life.     It may be hard to get out of bed; you may be starting to isolate yourself from others.    Symptoms of depression are starting to last most all day and this has happened for several days.     You may have suicidal thoughts but they are not constant.   What you need to do:     1. Call your care team, your response to treatment will improve if you keep your care team informed of your progress. Yellow periods are signs an adjustment may need to be made.     2. Continue your self-care, even if you have to fake it!    3. Talk to someone in your support network    4. Open up your depression survival kit           RED    ZONE Medical Alert - Get Help    What it looks like:     Depression is seriously interfering with your life.     You may experience these or other symptoms: You cant get out of bed most days, cant work or engage in other necessary activities, you have trouble taking care of basic hygiene, or basic  responsibilities, thoughts of suicide or death that will not go away, self-injurious behavior.     What you need to do:  1. Call your care team and request a same-day appointment. If they are not available (weekends or after hours) call your local crisis line, emergency room or 911.            Depression Self Care Plan / Survival Kit    Self-Care for Depression  Heres the deal. Your body and mind are really not as separate as most people think.  What you do and think affects how you feel and how you feel influences what you do and think. This means if you do things that people who feel good do, it will help you feel better.  Sometimes this is all it takes.  There is also a place for medication and therapy depending on how severe your depression is, so be sure to consult with your medical provider and/ or Behavioral Health Consultant if your symptoms are worsening or not improving.     In order to better manage my stress, I will:    Exercise  Get some form of exercise, every day. This will help reduce pain and release endorphins, the feel good chemicals in your brain. This is almost as good as taking antidepressants!  This is not the same as joining a gym and then never going! (they count on that by the way?) It can be as simple as just going for a walk or doing some gardening, anything that will get you moving.      Hygiene   Maintain good hygiene (Get out of bed in the morning, Make your bed, Brush your teeth, Take a shower, and Get dressed like you were going to work, even if you are unemployed).  If your clothes don't fit try to get ones that do.    Diet  I will strive to eat foods that are good for me, drink plenty of water, and avoid excessive sugar, caffeine, alcohol, and other mood-altering substances.  Some foods that are helpful in depression are: complex carbohydrates, B vitamins, flaxseed, fish or fish oil, fresh fruits and vegetables.    Psychotherapy  I agree to participate in Individual Therapy (if  recommended).    Medication  If prescribed medications, I agree to take them.  Missing doses can result in serious side effects.  I understand that drinking alcohol, or other illicit drug use, may cause potential side effects.  I will not stop my medication abruptly without first discussing it with my provider.    Staying Connected With Others  I will stay in touch with my friends, family members, and my primary care provider/team.    Use your imagination  Be creative.  We all have a creative side; it doesnt matter if its oil painting, sand castles, or mud pies! This will also kick up the endorphins.    Witness Beauty  (AKA stop and smell the roses) Take a look outside, even in mid-winter. Notice colors, textures. Watch the squirrels and birds.     Service to others  Be of service to others.  There is always someone else in need.  By helping others we can get out of ourselves and remember the really important things.  This also provides opportunities for practicing all the other parts of the program.    Humor  Laugh and be silly!  Adjust your TV habits for less news and crime-drama and more comedy.    Control your stress  Try breathing deep, massage therapy, biofeedback, and meditation. Find time to relax each day.     My support system    Clinic Contact:  Phone number:    Contact 1:  Phone number:    Contact 2:  Phone number:    [place]/:  Phone number:    Therapist:  Phone number:    [facility] crisis center:    Phone number:    Other community support:  Phone number:

## 2021-06-20 NOTE — LETTER
Letter by Vinita Ortega MD at      Author: Vinita Ortega MD Service: -- Author Type: --    Filed:  Encounter Date: 12/10/2019 Status: Signed         December 16, 2019     Patient: Yodit Lai   YOB: 1955   Date of Visit: 12/10/2019       To Whom it May Concern:    Yodit Lai was seen in my clinic on 12/10/2019. I am her primary care physician.  She mentioned to me that at time she has difficulty getting into and out of her bathtub due to her Fibromyalgia.  She and her ILS worked expresed hope that her [employer] funds could be used to cover the installation of a walk in shower that she could use rather than her bathtub.  Given her diagnosis, I support her in finding a way to get a shower installed and share their hope that CAD[employer] waiver funds could be used for this purpse    If you have any questions or concerns, please don't hesitate to call.    Sincerely,         Electronically signed by Vinita Ortega MD

## 2021-06-21 NOTE — LETTER
Letter by Vinita Ortega MD at      Author: Vinita Ortega MD Service: -- Author Type: --    Filed:  Encounter Date: 1/1/2021 Status: (Other)         Yodit Lai  [address]  [place] [address]             January 1, 2021         Dear Ms. Lai,    Below are the results from your recent visit: Your test for kidney function and electrolytes is normal except for a slightly below normal but stable GFR. The GFR is a measure of your kidney's filtering speed     Resulted Orders   Basic Metabolic Panel   Result Value Ref Range    Sodium 141 136 - 145 mmol/L    Potassium 4.9 3.5 - 5.0 mmol/L    Chloride 107 98 - 107 mmol/L    CO2 23 22 - 31 mmol/L    Anion Gap, Calculation 11 5 - 18 mmol/L    Glucose 105 70 - 125 mg/dL    Calcium 9.3 8.5 - 10.5 mg/dL    BUN 20 8 - 22 mg/dL    Creatinine 0.99 0.60 - 1.10 mg/dL    GFR MDRD Af Amer >60 >60 mL/min/1.73m2    GFR MDRD Non Af Amer 57 (L) >60 mL/min/1.73m2    Narrative    Fasting Glucose reference range is 70-99 mg/dL per  American Diabetes Association (ADA) guidelines.     Please call with questions or contact us using [employer].    Sincerely,        Electronically signed by Vinita Ortega MD

## 2021-06-22 NOTE — TELEPHONE ENCOUNTER
Call from pt     CC: Possible ear wax buildup - R ear     >No ringing   >No discharge   >No pain   >Muffled hearing in that ear  >No drainage      At home:    Nothing so far     Plan:   >Discussed care guidelines   >Discussed use of OTC ear gtts for the next few days - discussed directions for use   > If ineffective, can be managed by PCP  - not typically needs an ENT specialist to manage   >CB if changes or worsens         Raymond Cedeño, RN   Triage and Medication Refills        Reason for Disposition    Earwax problem and not relieved by home care advice    Protocols used: EARWAX-A-OH

## 2021-06-24 NOTE — TELEPHONE ENCOUNTER
Refill Approved    Rx renewed per Medication Renewal Policy. Medication was last renewed on 3/16/18 & 12/5/18.    Last office visit 6/7/18    Markie Mendoza, [facility] Triage/Med Refill 3/7/2019     Requested Prescriptions   Pending Prescriptions Disp Refills     rosuvastatin (CRESTOR) 10 MG tablet 30 tablet 11     Sig: Take 1 tablet (10 mg total) by mouth at bedtime.    Statins Refill Protocol (Hmg CoA Reductase Inhibitors) Passed - 3/6/2019  7:24 AM       Passed - PCP or prescribing provider visit in past 12 months     Last office visit with prescriber/PCP: 6/7/2018 Vinita Ortega MD OR same dept: 6/7/2018 Vinita Ortega MD OR same specialty: 6/7/2018 Vinita Ortega MD  Last physical: 3/16/2018 Last MTM visit: Visit date not found   Next visit within 3 mo: Visit date not found  Next physical within 3 mo: Visit date not found  Prescriber OR PCP: Vinita Ortega MD  Last diagnosis associated with med order: 1. Hyperlipidemia  - rosuvastatin (CRESTOR) 10 MG tablet; Take 1 tablet (10 mg total) by mouth at bedtime.  Dispense: 30 tablet; Refill: 11    If protocol passes may refill for 12 months if within 3 months of last provider visit (or a total of 15 months).             lisinopril (PRINIVIL,ZESTRIL) 20 MG tablet 90 tablet 0     Sig: Take 1 tablet (20 mg total) by mouth daily.    Ace Inhibitors Refill Protocol Passed - 3/6/2019  7:24 AM       Passed - PCP or prescribing provider visit in past 12 months      Last office visit with prescriber/PCP: 6/7/2018 Vinita Ortega MD OR same dept: 6/7/2018 Vinita Ortega MD OR same specialty: 6/7/2018 Vinita Ortega MD  Last physical: 3/16/2018 Last MTM visit: Visit date not found   Next visit within 3 mo: Visit date not found  Next physical within 3 mo: Visit date not found  Prescriber OR PCP: Vinita Ortega MD  Last diagnosis associated with med order: 1. Hyperlipidemia  - rosuvastatin (CRESTOR) 10 MG tablet; Take 1 tablet (10 mg total) by mouth at bedtime.   Dispense: 30 tablet; Refill: 11    If protocol passes may refill for 12 months if within 3 months of last provider visit (or a total of 15 months).            Passed - Serum Potassium in past 12 months    Lab Results   Component Value Date    Potassium 4.1 09/11/2018            Passed - Blood pressure filed in past 12 months    BP Readings from Last 1 Encounters:   09/11/18 138/80            Passed - Serum Creatinine in past 12 months    Creatinine   Date Value Ref Range Status   09/11/2018 0.84 0.60 - 1.10 mg/dL Final

## 2021-06-24 NOTE — TELEPHONE ENCOUNTER
Who is calling:  Pharmacy  Reason for Call:   Patient is requesting a 90 day supply  Date of last appointment with primary care:   6/7/2018  Has the patient been recently seen:  No  Okay to leave a detailed message: Yes

## 2021-07-03 NOTE — ADDENDUM NOTE
Addendum Note by Jazmin Cannon MD at 6/2/2020 12:53 PM     Author: Jazmin Cannon MD Service: -- Author Type: Physician    Filed: 6/2/2020 12:53 PM Encounter Date: 6/1/2020 Status: Signed    : Jazmin Cannon MD (Physician)    Addended by: JAZMIN CANNON on: 6/2/2020 12:53 PM        Modules accepted: Orders

## 2021-07-03 NOTE — ADDENDUM NOTE
Addendum Note by Carmen Greenfield CMA at 6/2/2020  7:44 AM     Author: Carmen Greenfield CMA Service: -- Author Type: Certified Medical Assistant    Filed: 6/2/2020  7:44 AM Encounter Date: 6/1/2020 Status: Signed    : Carmen Greenfield CMA (Certified Medical Assistant)    Addended by: CARMEN GREENFIELD on: 6/2/2020 07:44 AM        Modules accepted: Orders

## 2021-07-13 ENCOUNTER — RECORDS - HEALTHEAST (OUTPATIENT)
Dept: ADMINISTRATIVE | Facility: CLINIC | Age: 66
End: 2021-07-13

## 2021-08-06 NOTE — PATIENT INSTRUCTIONS - HE
Patient Instructions by Vinita Ortega MD at 5/14/2019  3:40 PM     Author: Vinita Ortega MD Service: -- Author Type: Physician    Filed: 5/14/2019  4:28 PM Encounter Date: 5/14/2019 Status: Addendum    : Vinita Ortega MD (Physician)    Related Notes: Original Note by Vinita Ortega MD (Physician) filed at 5/14/2019  3:55 PM       Check your insurance for shingles vaccine coverage and where you should get the vaccine    Patient Education     Your Health Risk Assessment indicates you feel you are not in good physical health.    A healthy lifestyle helps keep the body fit and the mind alert. It helps protect you from disease, helps you fight disease, and helps prevent chronic disease (disease that doesn't go away) from getting worse. This is important as you get older and begin to notice twinges in muscles and joints and a decline in the strength and stamina you once took for granted. A healthy lifestyle includes good healthcare, good nutrition, weight control, recreation, and regular exercise. Avoid harmful substances and do what you can to keep safe. Another part of a healthy lifestyle is stay mentally active and socially involved.    Good healthcare     Have a wellness visit every year.     If you have new symptoms, let us know right away. Don't wait until the next checkup.     Take medicines exactly as prescribed and keep your medicines in a safe place. Tell us if your medicine causes problems.   Healthy diet and weight control     Eat 3 or 4 small, nutritious, low-fat, high-fiber meals a day. Include a variety of fruits, vegetables, and whole-grain foods.     Make sure you get enough calcium in your diet. Calcium, vitamin D, and exercise help prevent osteoporosis (bone thinning).     If you live alone, try eating with others when you can. That way you get a good meal and have company while you eat it.     Try to keep a healthy weight. If you eat more calories than your body uses for energy, it will be  stored as fat and you will gain weight.     Recreation   Recreation is not limited to sports and team events. It includes any activity that provides relaxation, interest, enjoyment, and exercise. Recreation provides an outlet for physical, mental, and social energy. It can give a sense of worth and achievement. It can help you stay healthy.       Patient Education     Exercise for a Healthier Heart  You may wonder how you can improve the health of your heart. If youre thinking about exercise, youre on the right track. You dont need to become an athlete, but you do need a certain amount of brisk exercise to help strengthen your heart. If you have been diagnosed with a heart condition, your doctor may recommend exercise to help stabilize your condition. To help make exercise a habit, choose safe, fun activities.       Be sure to check with your health care provider before starting an exercise program.    Why exercise?  Exercising regularly offers many healthy rewards. It can help you do all of the following:    Improve your blood cholesterol levels to help prevent further heart trouble    Lower your blood pressure to help prevent a stroke or heart attack    Control diabetes, or reduce your risk of getting this disease    Improve your heart and lung function    Reach and maintain a healthy weight    Make your muscles stronger and more limber so you can stay active    Prevent falls and fractures by slowing the loss of bone mass (osteoporosis)    Manage stress better  Exercise tips  Ease into your routine. Set small goals. Then build on them.  Exercise on most days. Aim for a total of 150 or more minutes of moderate to  vigorous intensity activity each week. Consider 40 minutes, 3 to 4 times a week. For best results, activity should last for 40 minutes on average. It is OK to work up to the 40 minute period over time. Examples of moderate-intensity activity is walking one mile in 15 minutes or 30 to 45 minutes of yard  work.  Step up your daily activity level. Along with your exercise program, try being more active throughout the day. Walk instead of drive. Do more household tasks or yard work.  Choose one or more activities you enjoy. Walking is one of the easiest things you can do. You can also try swimming, riding a bike, or taking an exercise class.  Stop exercising and call your doctor if you:    Have chest pain or feel dizzy or lightheaded    Feel burning, tightness, pressure, or heaviness in your chest, neck, shoulders, back, or arms    Have unusual shortness of breath    Have increased joint or muscle pain    Have palpitations or an irregular heartbeat      7070-7605 [employer]. [address] [place] [address], [place] [address]. All rights reserved. This information is not intended as a substitute for professional medical care. Always follow your healthcare professional's instructions.         Patient Education   Instrumental Activities of Daily Living  Your Health Risk Assessment indicates you have difficulties with instrumental activities of daily living which include laundry, housekeeping, banking, shopping, using the telephone, food preparation, transportation, or taking your own medications. Please make a follow up appointment for us to address this issue in more detail.    [email or web address] has resources available on the following website: [email or web address]     Also, here is a local agency that provides help with meals and other assistance:   [facility] Line: [phone]     Patient Education   Your Health Risk Assessment indicates you feel you are not in good emotional health.    Recreation   Recreation is not limited to sports and team events. It includes any activity that provides relaxation, interest, enjoyment, and exercise. Recreation provides an outlet for physical, mental, and social energy. It can give a sense of worth and achievement. It can help you stay healthy.    Mental  Exercise and Social Involvement  Mental and emotional health is as important as physical health. Keep in touch with friends and family. Stay as active as possible. Continue to learn and challenge yourself.   Things you can do to stay mentally active are:    Learn something new, like a foreign language or musical instrument.     Play SCRABBLE or do crossword puzzles. If you cannot find people to play these games with you at home, you can play them with others on your computer through the Internet.     Join a games club--anything from card games to chess or checkers or lawn bowling.     Start a new hobby.     Go back to school.     Volunteer.     Read.     Keep up with world events.       Patient Education   Understanding Advance Care Planning  Advance care planning is the process of deciding ones own future medical care. It helps ensure that if you cant speak for yourself, your wishes can still be carried out. The plan is a series of legal documents that note a persons wishes. The documents vary by state. Advance care planning may be done when a person has a serious illness that is expected to get worse. It may be done before major surgery. And it can help you and your family be prepared in case of a major illness or injury. Advance care planning helps with making decisions at these times.       A health care proxy is a person who acts as the voice of a patient when the patient cant speak for himself or herself. The name of this role varies by state. It may be called a Durable Medical Power of  or Durable Power of  for Healthcare. It may be called an agent, surrogate, or advocate. Or it may be called a representative or decision maker. It is an official duty that is identified by a legal document. The document also varies by state.    Why Is Advance Care Planning Important?  If a person communicates their healthcare wishes:    They will be given medical care that matches their values and  goals.    Their family members will not be forced to make decisions in a crisis with no guidance.  Creating a Plan  Making an advance care plan is often done in 3 steps:    Thinking about ones wishes. To create an advance care plan, you should think about what kind of medical treatment you would want if you lose the ability to communicate. Are there any situations in which you would refuse or stop treatment? Are there therapies you would want or not want? And whom do you want to make decisions for you? There are many places to learn more about how to plan for your care. Ask your doctor or  for resources.    Picking a health care proxy. This means choosing a trusted person to speak for you only when you cant speak for yourself. When you cannot make medical decisions, your proxy makes sure the instructions in your advance care plan are followed. A proxy does not make decisions based on his or her own opinions. They must put aside those opinions and values if needed, and carry out your wishes.    Filling out the legal documents. There are several kinds of legal documents for advance care planning. Each one tells health care providers your wishes. The documents may vary by state. They must be signed and may need to be witnessed or notarized. You can cancel or change them whenever you wish. Depending on your state, the documents may include a Healthcare Proxy form, Living Will, Durable Medical Power of , Advance Directive, or others.  The Familys Role  The best help a family can give is to support their loved ones wishes. Open and honest communication is vital. Family should express any concerns they have about the patients choices while the patient can still make decisions.    [id number] [employer]. [address] [place] [address], [place] [address]. All rights reserved. This information is not intended as a substitute for professional medical care. Always follow your healthcare professional's  instructions.         Also, [facility] offers a free, downloadable health care directive that allows you to share your treatment choices and personal preferences if you cannot communicate your wishes. It also allows you to appoint another person (called a health care agent) to make health care decisions if you are unable to do so. You can download an advance directive by going here: [email or web address]     Patient Education   Personalized Prevention Plan  You are due for the preventive services outlined below.  Your care team is available to assist you in scheduling these services.  If you have already completed any of these items, please share that information with your care team to update in your medical record.  Health Maintenance   Topic Date Due   ? DEPRESSION FOLLOW UP  1955   ? ADVANCE DIRECTIVES DISCUSSED WITH PATIENT  06/18/1975   ? ZOSTER VACCINES (1 of 2) 06/18/2007   ? TD 18+ HE  02/15/2020   ? INFLUENZA VACCINE RULE BASED (Season Ended) 08/01/2019   ? MAMMOGRAM  03/23/2020   ? COLONOSCOPY  02/08/2021   ? PAP SMEAR  03/16/2023   ? TDAP ADULT ONE TIME DOSE  Completed

## 2021-09-03 ENCOUNTER — TELEPHONE (OUTPATIENT)
Dept: FAMILY MEDICINE | Facility: CLINIC | Age: 66
End: 2021-09-03

## 2021-09-03 NOTE — CONFIDENTIAL NOTE
Called [facility] at [phone] and talked to Caity Hong about a form I had received for Yodit.  Two issues   1) I have not seen her since December and she has found a new PCP   2) there is a release of information form, however it has not been signed by the patient, so even if she were still seeing me, I cannot release this information    If I get a signed ALESSIA, I could fill out the form based on previous information

## 2021-10-21 RX ORDER — LISINOPRIL 20 MG/1
TABLET ORAL
Qty: 90 TABLET | OUTPATIENT
Start: 2021-10-21

## 2021-10-21 NOTE — TELEPHONE ENCOUNTER
"Disp Refills Start End KARLA    lisinopriL (PRINIVIL,ZESTRIL) 20 MG tablet 90 tablet 3 12/24/2020  No   Sig: TAKE 1 TABLET(20 MG) BY MOUTH DAILY   Sent to pharmacy as: lisinopriL 20 mg tablet (PRINIVIL,ZESTRIL)   E-Prescribing Status: Receipt confirmed by pharmacy (12/24/2020  9:05 AM CST)       lisinopriL (PRINIVIL,ZESTRIL) 20 MG tablet [[id number]]    Electronically signed by: Jennifer Almonte RN on 12/24/20 0905 Status: Active   Ordering user: Jennifer Almonte RN 12/24/20 0905 Ordering provider: Vinita Ortega MD   Authorized by: Vinita Ortega MD   Frequency:  12/24/20 - Until Discontinued Released by: Jennifer Almonte RN 12/24/20 0905   Diagnoses  Essential hypertension, benign [I10]     Routing refill request to provider for review/approval because:  BP out of range  Early refill request.  Clarify PCP in Epic if appropriate    Last Written Prescription Date:  12/24/20  Last Fill Quantity: 90,  # refills: 3   Last office visit provider:  12/22/20     Requested Prescriptions   Pending Prescriptions Disp Refills     lisinopril (ZESTRIL) 20 MG tablet [Pharmacy Med Name: LISINOPRIL 20MG TABLETS] 90 tablet      Sig: TAKE 1 TABLET(20 MG) BY MOUTH DAILY       ACE Inhibitors (Including Combos) Protocol Failed - 10/20/2021  1:29 PM        Failed - Blood pressure under 140/90 in past 12 months     BP Readings from Last 3 Encounters:   12/22/20 (!) 142/80   08/08/20 (!) 173/81   07/30/20 (!) 196/112                 Passed - Recent (12 mo) or future (30 days) visit within the authorizing provider's specialty     Patient has had an office visit with the authorizing provider or a provider within the authorizing providers department within the previous 12 mos or has a future within next 30 days. See \"Patient Info\" tab in inbasket, or \"Choose Columns\" in Meds & Orders section of the refill encounter.              Passed - Medication is active on med list        Passed - Patient is age 18 or older        Passed - No active pregnancy " on record        Passed - Normal serum creatinine on file in past 12 months     Recent Labs   Lab Test 12/22/20  1336   CR 0.99       Ok to refill medication if creatinine is low          Passed - Normal serum potassium on file in past 12 months     Recent Labs   Lab Test 12/22/20  1336   POTASSIUM 4.9             Passed - No positive pregnancy test within past 12 months             Lynne Blunt RN 10/21/21 11:08 AM

## 2022-03-10 ENCOUNTER — DOCUMENTATION ONLY (OUTPATIENT)
Dept: OTHER | Facility: CLINIC | Age: 67
End: 2022-03-10
Payer: MEDICARE

## 2022-06-02 NOTE — ANESTHESIA PREPROCEDURE EVALUATION
Anesthesia Evaluation      Patient summary reviewed     Airway   Mallampati: I  Neck ROM: full   Pulmonary - negative ROS and normal exam   (+) asthma  sleep apnea,                          Cardiovascular - normal exam  Exercise tolerance: > or = 4 METS  (+) hypertension, ,     (-) murmur  Rhythm: regular  Rate: normal,    no murmur      Neuro/Psych    (+) neuromuscular disease,  depression,     Endo/Other    (+) obesity,      GI/Hepatic/Renal    (+)   chronic renal disease,           Dental    (+) poor dentition                         Anesthesia Plan  Planned anesthetic: general mask    ASA 3   Induction: intravenous   Anesthetic plan and risks discussed with: patient    Post-op plan: routine recovery           Improved with Plaquenil  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